# Patient Record
Sex: MALE | Race: OTHER | HISPANIC OR LATINO | Employment: PART TIME | URBAN - METROPOLITAN AREA
[De-identification: names, ages, dates, MRNs, and addresses within clinical notes are randomized per-mention and may not be internally consistent; named-entity substitution may affect disease eponyms.]

---

## 2022-02-23 ENCOUNTER — HOSPITAL ENCOUNTER (EMERGENCY)
Facility: HOSPITAL | Age: 32
Discharge: HOME | End: 2022-02-23
Attending: INTERNAL MEDICINE
Payer: COMMERCIAL

## 2022-02-23 VITALS
OXYGEN SATURATION: 99 % | DIASTOLIC BLOOD PRESSURE: 85 MMHG | TEMPERATURE: 97.2 F | RESPIRATION RATE: 18 BRPM | WEIGHT: 156 LBS | HEART RATE: 91 BPM | SYSTOLIC BLOOD PRESSURE: 154 MMHG | BODY MASS INDEX: 27.64 KG/M2 | HEIGHT: 63 IN

## 2022-02-23 DIAGNOSIS — L70.9 ACNE: Primary | ICD-10-CM

## 2022-02-23 DIAGNOSIS — G56.00 CARPAL TUNNEL SYNDROME: ICD-10-CM

## 2022-02-23 PROCEDURE — 99282 EMERGENCY DEPT VISIT SF MDM: CPT

## 2022-02-23 PROCEDURE — 99284 EMERGENCY DEPT VISIT MOD MDM: CPT | Performed by: INTERNAL MEDICINE

## 2022-02-23 RX ORDER — MULTIVITAMIN
1 CAPSULE ORAL DAILY
COMMUNITY

## 2022-02-23 RX ORDER — CLOBETASOL PROPIONATE 0.5 MG/G
CREAM TOPICAL 2 TIMES DAILY
Qty: 30 G | Refills: 1 | Status: SHIPPED | OUTPATIENT
Start: 2022-02-23

## 2022-02-23 RX ORDER — DOXYCYCLINE HYCLATE 100 MG/1
100 CAPSULE ORAL 2 TIMES DAILY
Qty: 20 CAPSULE | Refills: 0 | Status: SHIPPED | OUTPATIENT
Start: 2022-02-23 | End: 2022-03-05

## 2022-02-23 NOTE — DISCHARGE INSTRUCTIONS
Follow-up with primary care doctor, further workup of potential carpal tunnel  Take doxycycline twice a day for 14 days

## 2022-02-23 NOTE — ED PROVIDER NOTES
History  Chief Complaint   Patient presents with    Rash     Pt reports b/l arm tingling x 1 month at night  Pt also reports constant rash x 5 months +tenderness Pt denies itching, fevers, chills,      63-year-old  male presents with a rash  Comes intermittently  Usually on the chest   Occasionally paretic  Sometimes worse than now, seems to recur  Also describes numbness in both arms particularly when he goes to sleep  Prior to Admission Medications   Prescriptions Last Dose Informant Patient Reported? Taking? Multiple Vitamin (multivitamin) capsule   Yes Yes   Sig: Take 1 capsule by mouth daily      Facility-Administered Medications: None       Past Medical History:   Diagnosis Date    Known health problems: none        Past Surgical History:   Procedure Laterality Date    ORTHOPEDIC SURGERY      R foot        History reviewed  No pertinent family history  I have reviewed and agree with the history as documented  E-Cigarette/Vaping     E-Cigarette/Vaping Substances     Social History     Tobacco Use    Smoking status: Never Smoker    Smokeless tobacco: Never Used   Substance Use Topics    Alcohol use: Yes     Comment: occasionally     Drug use: Never       Review of Systems   Constitutional: Negative for chills and fever  HENT: Negative for rhinorrhea and sore throat  Eyes: Negative for visual disturbance  Respiratory: Negative for cough and shortness of breath  Cardiovascular: Negative for chest pain and leg swelling  Gastrointestinal: Negative for abdominal pain, diarrhea, nausea and vomiting  Genitourinary: Negative for dysuria  Musculoskeletal: Negative for back pain and myalgias  Skin: Positive for rash  Neurological: Positive for weakness and numbness  Negative for dizziness and headaches  Weakness occasion in left hand greater than the right  Both hands go numb particularly at night   Psychiatric/Behavioral: Negative for confusion     All other systems reviewed and are negative  Physical Exam  Physical Exam  Vitals and nursing note reviewed  Constitutional:       Appearance: He is well-developed  HENT:      Nose: Nose normal       Mouth/Throat:      Pharynx: No oropharyngeal exudate  Eyes:      General: No scleral icterus  Conjunctiva/sclera: Conjunctivae normal       Pupils: Pupils are equal, round, and reactive to light  Neck:      Vascular: No JVD  Trachea: No tracheal deviation  Cardiovascular:      Rate and Rhythm: Normal rate and regular rhythm  Heart sounds: Normal heart sounds  No murmur heard  Pulmonary:      Effort: Pulmonary effort is normal  No respiratory distress  Breath sounds: Normal breath sounds  No wheezing or rales  Abdominal:      General: Bowel sounds are normal       Palpations: Abdomen is soft  Tenderness: There is no abdominal tenderness  There is no guarding  Musculoskeletal:         General: No tenderness  Normal range of motion  Cervical back: Normal range of motion and neck supple  Skin:     General: Skin is warm and dry  Findings: Rash present  Comments: Actinic form rash across the chest and up into the shoulders  Comedone and erythema noted   Neurological:      Mental Status: He is alert and oriented to person, place, and time  Cranial Nerves: No cranial nerve deficit  Sensory: No sensory deficit  Motor: No abnormal muscle tone        Comments: 5/5 motor, nl sens, negative Tinel's positive Phalen's   Psychiatric:         Mood and Affect: Mood normal          Behavior: Behavior normal          Vital Signs  ED Triage Vitals [02/23/22 1226]   Temperature Pulse Respirations Blood Pressure SpO2   (!) 97 2 °F (36 2 °C) 91 18 154/85 99 %      Temp src Heart Rate Source Patient Position - Orthostatic VS BP Location FiO2 (%)   -- Monitor Sitting Right arm --      Pain Score       --           Vitals:    02/23/22 1226   BP: 154/85   Pulse: 91   Patient Position - Orthostatic VS: Sitting         Visual Acuity      ED Medications  Medications - No data to display    Diagnostic Studies  Results Reviewed     None                 No orders to display              Procedures  Procedures         ED Course                               SBIRT 22yo+      Most Recent Value   SBIRT (22 yo +)    In order to provide better care to our patients, we are screening all of our patients for alcohol and drug use  Would it be okay to ask you these screening questions? No Filed at: 02/23/2022 1238                    MDM    Disposition  Final diagnoses:   Acne   Carpal tunnel syndrome     Time reflects when diagnosis was documented in both MDM as applicable and the Disposition within this note     Time User Action Codes Description Comment    2/23/2022 12:44 PM Lashawn Willis Add [L70 9] Acne     2/23/2022 12:45 PM Lashawn Willis Add [G56 00] Carpal tunnel syndrome       ED Disposition     ED Disposition Condition Date/Time Comment    Discharge Stable Wed Feb 23, 2022 12:43 PM Mat Hem discharge to home/self care              Follow-up Information    None         Discharge Medication List as of 2/23/2022 12:51 PM      START taking these medications    Details   clobetasol (TEMOVATE) 0 05 % cream Apply topically 2 (two) times a day, Starting Wed 2/23/2022, Normal      doxycycline hyclate (VIBRAMYCIN) 100 mg capsule Take 1 capsule (100 mg total) by mouth 2 (two) times a day for 10 days, Starting Wed 2/23/2022, Until Sat 3/5/2022, Normal         CONTINUE these medications which have NOT CHANGED    Details   Multiple Vitamin (multivitamin) capsule Take 1 capsule by mouth daily, Historical Med                 PDMP Review     None          ED Provider  Electronically Signed by           Agustina Gonzales DO  02/23/22 9017

## 2022-03-30 ENCOUNTER — APPOINTMENT (OUTPATIENT)
Dept: RADIOLOGY | Facility: CLINIC | Age: 32
End: 2022-03-30
Payer: COMMERCIAL

## 2022-03-30 ENCOUNTER — APPOINTMENT (OUTPATIENT)
Dept: LAB | Facility: CLINIC | Age: 32
End: 2022-03-30
Payer: COMMERCIAL

## 2022-03-30 ENCOUNTER — OFFICE VISIT (OUTPATIENT)
Dept: FAMILY MEDICINE CLINIC | Facility: CLINIC | Age: 32
End: 2022-03-30
Payer: COMMERCIAL

## 2022-03-30 VITALS
HEIGHT: 63 IN | TEMPERATURE: 99.2 F | DIASTOLIC BLOOD PRESSURE: 62 MMHG | SYSTOLIC BLOOD PRESSURE: 112 MMHG | WEIGHT: 155.4 LBS | RESPIRATION RATE: 18 BRPM | OXYGEN SATURATION: 100 % | HEART RATE: 75 BPM | BODY MASS INDEX: 27.54 KG/M2

## 2022-03-30 DIAGNOSIS — Z00.00 ANNUAL PHYSICAL EXAM: ICD-10-CM

## 2022-03-30 DIAGNOSIS — M25.531 RIGHT WRIST PAIN: ICD-10-CM

## 2022-03-30 DIAGNOSIS — M79.602 PARESTHESIA AND PAIN OF BOTH UPPER EXTREMITIES: ICD-10-CM

## 2022-03-30 DIAGNOSIS — M79.601 PARESTHESIA AND PAIN OF BOTH UPPER EXTREMITIES: ICD-10-CM

## 2022-03-30 DIAGNOSIS — M54.12 CERVICAL RADICULOPATHY: ICD-10-CM

## 2022-03-30 DIAGNOSIS — Z11.4 SCREENING FOR HIV (HUMAN IMMUNODEFICIENCY VIRUS): ICD-10-CM

## 2022-03-30 DIAGNOSIS — Z11.59 NEED FOR HEPATITIS C SCREENING TEST: ICD-10-CM

## 2022-03-30 DIAGNOSIS — L70.9 ACNE: ICD-10-CM

## 2022-03-30 DIAGNOSIS — Z00.00 ANNUAL PHYSICAL EXAM: Primary | ICD-10-CM

## 2022-03-30 DIAGNOSIS — Z11.3 SCREENING FOR STDS (SEXUALLY TRANSMITTED DISEASES): ICD-10-CM

## 2022-03-30 DIAGNOSIS — R74.8 ELEVATED LIVER ENZYMES: ICD-10-CM

## 2022-03-30 DIAGNOSIS — R20.2 PARESTHESIA AND PAIN OF BOTH UPPER EXTREMITIES: ICD-10-CM

## 2022-03-30 LAB
ALBUMIN SERPL BCP-MCNC: 5 G/DL (ref 3.5–5)
ALP SERPL-CCNC: 92 U/L (ref 46–116)
ALT SERPL W P-5'-P-CCNC: 120 U/L (ref 12–78)
ANION GAP SERPL CALCULATED.3IONS-SCNC: 3 MMOL/L (ref 4–13)
AST SERPL W P-5'-P-CCNC: 85 U/L (ref 5–45)
BILIRUB DIRECT SERPL-MCNC: 0.1 MG/DL (ref 0–0.2)
BILIRUB SERPL-MCNC: 0.65 MG/DL (ref 0.2–1)
BUN SERPL-MCNC: 7 MG/DL (ref 5–25)
CALCIUM SERPL-MCNC: 9.4 MG/DL (ref 8.3–10.1)
CHLORIDE SERPL-SCNC: 108 MMOL/L (ref 100–108)
CHOLEST SERPL-MCNC: 264 MG/DL
CO2 SERPL-SCNC: 27 MMOL/L (ref 21–32)
CREAT SERPL-MCNC: 0.68 MG/DL (ref 0.6–1.3)
EST. AVERAGE GLUCOSE BLD GHB EST-MCNC: 100 MG/DL
GFR SERPL CREATININE-BSD FRML MDRD: 129 ML/MIN/1.73SQ M
GLUCOSE P FAST SERPL-MCNC: 99 MG/DL (ref 65–99)
HBA1C MFR BLD: 5.1 %
HBV CORE AB SER QL: NORMAL
HBV SURFACE AB SER-ACNC: 4.46 MIU/ML
HBV SURFACE AG SER QL: NORMAL
HCV AB SER QL: NORMAL
HDLC SERPL-MCNC: 52 MG/DL
LDLC SERPL CALC-MCNC: 181 MG/DL (ref 0–100)
NONHDLC SERPL-MCNC: 212 MG/DL
POTASSIUM SERPL-SCNC: 4 MMOL/L (ref 3.5–5.3)
PROT SERPL-MCNC: 8.1 G/DL (ref 6.4–8.2)
SODIUM SERPL-SCNC: 138 MMOL/L (ref 136–145)
TRIGL SERPL-MCNC: 154 MG/DL

## 2022-03-30 PROCEDURE — 1036F TOBACCO NON-USER: CPT | Performed by: INTERNAL MEDICINE

## 2022-03-30 PROCEDURE — 80076 HEPATIC FUNCTION PANEL: CPT

## 2022-03-30 PROCEDURE — 73110 X-RAY EXAM OF WRIST: CPT

## 2022-03-30 PROCEDURE — 80061 LIPID PANEL: CPT

## 2022-03-30 PROCEDURE — 80048 BASIC METABOLIC PNL TOTAL CA: CPT

## 2022-03-30 PROCEDURE — 99214 OFFICE O/P EST MOD 30 MIN: CPT | Performed by: INTERNAL MEDICINE

## 2022-03-30 PROCEDURE — 87491 CHLMYD TRACH DNA AMP PROBE: CPT

## 2022-03-30 PROCEDURE — 36415 COLL VENOUS BLD VENIPUNCTURE: CPT

## 2022-03-30 PROCEDURE — 86803 HEPATITIS C AB TEST: CPT

## 2022-03-30 PROCEDURE — 3725F SCREEN DEPRESSION PERFORMED: CPT | Performed by: INTERNAL MEDICINE

## 2022-03-30 PROCEDURE — 86592 SYPHILIS TEST NON-TREP QUAL: CPT

## 2022-03-30 PROCEDURE — 87389 HIV-1 AG W/HIV-1&-2 AB AG IA: CPT

## 2022-03-30 PROCEDURE — 3008F BODY MASS INDEX DOCD: CPT | Performed by: INTERNAL MEDICINE

## 2022-03-30 PROCEDURE — 87591 N.GONORRHOEAE DNA AMP PROB: CPT

## 2022-03-30 PROCEDURE — 99385 PREV VISIT NEW AGE 18-39: CPT | Performed by: INTERNAL MEDICINE

## 2022-03-30 PROCEDURE — 86706 HEP B SURFACE ANTIBODY: CPT

## 2022-03-30 PROCEDURE — 83036 HEMOGLOBIN GLYCOSYLATED A1C: CPT

## 2022-03-30 PROCEDURE — 87340 HEPATITIS B SURFACE AG IA: CPT

## 2022-03-30 PROCEDURE — 86704 HEP B CORE ANTIBODY TOTAL: CPT

## 2022-03-30 RX ORDER — METHOCARBAMOL 750 MG/1
750 TABLET, FILM COATED ORAL 4 TIMES DAILY PRN
COMMUNITY
Start: 2022-03-27 | End: 2022-03-30 | Stop reason: SDUPTHER

## 2022-03-30 RX ORDER — NAPROXEN 500 MG/1
500 TABLET ORAL EVERY 12 HOURS PRN
COMMUNITY
Start: 2022-03-27 | End: 2022-03-30 | Stop reason: SDUPTHER

## 2022-03-30 RX ORDER — NAPROXEN 500 MG/1
500 TABLET ORAL EVERY 12 HOURS PRN
Qty: 20 TABLET | Refills: 0 | Status: SHIPPED | OUTPATIENT
Start: 2022-03-30

## 2022-03-30 RX ORDER — BENZOYL PEROXIDE 50 MG/ML
LIQUID TOPICAL 2 TIMES DAILY
COMMUNITY

## 2022-03-30 RX ORDER — CLINDAMYCIN PHOSPHATE 10 MG/G
GEL TOPICAL 2 TIMES DAILY
COMMUNITY

## 2022-03-30 RX ORDER — METHOCARBAMOL 750 MG/1
750 TABLET, FILM COATED ORAL EVERY 6 HOURS PRN
Qty: 30 TABLET | Refills: 1 | Status: SHIPPED | OUTPATIENT
Start: 2022-03-30 | End: 2022-04-09

## 2022-03-30 NOTE — PATIENT INSTRUCTIONS
Wellness Visit for Adults   AMBULATORY CARE:   A wellness visit  is when you see your healthcare provider to get screened for health problems  Your healthcare provider will also give you advice on how to stay healthy  Write down your questions so you remember to ask them  Ask your healthcare provider how often you should have a wellness visit  What happens at a wellness visit:  Your healthcare provider will ask about your health, and your family history of health problems  This includes high blood pressure, heart disease, and cancer  He or she will ask if you have symptoms that concern you, if you smoke, and about your mood  You may also be asked about your intake of medicines, supplements, food, and alcohol  Any of the following may be done:  · Your weight  will be checked  Your height may also be checked so your body mass index (BMI) can be calculated  Your BMI shows if you are at a healthy weight  · Your blood pressure  and heart rate will be checked  Your temperature may also be checked  · Blood and urine tests  may be done  Blood tests may be done to check your cholesterol levels  Abnormal cholesterol levels increase your risk for heart disease and stroke  You may also need a blood or urine test to check for diabetes if you are at increased risk  Urine tests may be done to look for signs of an infection or kidney disease  · A physical exam  includes checking your heartbeat and lungs with a stethoscope  Your healthcare provider may also check your skin to look for sun damage  · Screening tests  may be recommended  A screening test is done to check for diseases that may not cause symptoms  The screening tests you may need depend on your age, gender, family history, and lifestyle habits  For example, colorectal screening may be recommended if you are 48years old or older  Screening tests you need if you are a woman:   · A Pap smear  is used to screen for cervical cancer   Pap smears are usually done every 3 to 5 years depending on your age  You may need them more often if you have had abnormal Pap smear test results in the past  Ask your healthcare provider how often you should have a Pap smear  · A mammogram  is an x-ray of your breasts to screen for breast cancer  Experts recommend mammograms every 2 years starting at age 48 years  You may need a mammogram at age 52 years or younger if you have an increased risk for breast cancer  Talk to your healthcare provider about when you should start having mammograms and how often you need them  Vaccines you may need:   · Get an influenza vaccine  every year  The influenza vaccine protects you from the flu  Several types of viruses cause the flu  The viruses change over time, so new vaccines are made each year  · Get a tetanus-diphtheria (Td) booster vaccine  every 10 years  This vaccine protects you against tetanus and diphtheria  Tetanus is a severe infection that may cause painful muscle spasms and lockjaw  Diphtheria is a severe bacterial infection that causes a thick covering in the back of your mouth and throat  · Get a human papillomavirus (HPV) vaccine  if you are female and aged 23 to 32 or male 23 to 24 and never received it  This vaccine protects you from HPV infection  HPV is the most common infection spread by sexual contact  HPV may also cause vaginal, penile, and anal cancers  · Get a pneumococcal vaccine  if you are aged 72 years or older  The pneumococcal vaccine is an injection given to protect you from pneumococcal disease  Pneumococcal disease is an infection caused by pneumococcal bacteria  The infection may cause pneumonia, meningitis, or an ear infection  · Get a shingles vaccine  if you are 60 or older, even if you have had shingles before  The shingles vaccine is an injection to protect you from the varicella-zoster virus  This is the same virus that causes chickenpox   Shingles is a painful rash that develops in people who had chickenpox or have been exposed to the virus  How to eat healthy:  My Plate is a model for planning healthy meals  It shows the types and amounts of foods that should go on your plate  Fruits and vegetables make up about half of your plate, and grains and protein make up the other half  A serving of dairy is included on the side of your plate  The amount of calories and serving sizes you need depends on your age, gender, weight, and height  Examples of healthy foods are listed below:  · Eat a variety of vegetables  such as dark green, red, and orange vegetables  You can also include canned vegetables low in sodium (salt) and frozen vegetables without added butter or sauces  · Eat a variety of fresh fruits , canned fruit in 100% juice, frozen fruit, and dried fruit  · Include whole grains  At least half of the grains you eat should be whole grains  Examples include whole-wheat bread, wheat pasta, brown rice, and whole-grain cereals such as oatmeal     · Eat a variety of protein foods such as seafood (fish and shellfish), lean meat, and poultry without skin (turkey and chicken)  Examples of lean meats include pork leg, shoulder, or tenderloin, and beef round, sirloin, tenderloin, and extra lean ground beef  Other protein foods include eggs and egg substitutes, beans, peas, soy products, nuts, and seeds  · Choose low-fat dairy products such as skim or 1% milk or low-fat yogurt, cheese, and cottage cheese  · Limit unhealthy fats  such as butter, hard margarine, and shortening  Exercise:  Exercise at least 30 minutes per day on most days of the week  Some examples of exercise include walking, biking, dancing, and swimming  You can also fit in more physical activity by taking the stairs instead of the elevator or parking farther away from stores  Include muscle strengthening activities 2 days each week  Regular exercise provides many health benefits   It helps you manage your weight, and decreases your risk for type 2 diabetes, heart disease, stroke, and high blood pressure  Exercise can also help improve your mood  Ask your healthcare provider about the best exercise plan for you  General health and safety guidelines:   · Do not smoke  Nicotine and other chemicals in cigarettes and cigars can cause lung damage  Ask your healthcare provider for information if you currently smoke and need help to quit  E-cigarettes or smokeless tobacco still contain nicotine  Talk to your healthcare provider before you use these products  · Limit alcohol  A drink of alcohol is 12 ounces of beer, 5 ounces of wine, or 1½ ounces of liquor  · Lose weight, if needed  Being overweight increases your risk of certain health conditions  These include heart disease, high blood pressure, type 2 diabetes, and certain types of cancer  · Protect your skin  Do not sunbathe or use tanning beds  Use sunscreen with a SPF 15 or higher  Apply sunscreen at least 15 minutes before you go outside  Reapply sunscreen every 2 hours  Wear protective clothing, hats, and sunglasses when you are outside  · Drive safely  Always wear your seatbelt  Make sure everyone in your car wears a seatbelt  A seatbelt can save your life if you are in an accident  Do not use your cell phone when you are driving  This could distract you and cause an accident  Pull over if you need to make a call or send a text message  · Practice safe sex  Use latex condoms if are sexually active and have more than one partner  Your healthcare provider may recommend screening tests for sexually transmitted infections (STIs)  · Wear helmets, lifejackets, and protective gear  Always wear a helmet when you ride a bike or motorcycle, go skiing, or play sports that could cause a head injury  Wear protective equipment when you play sports  Wear a lifejacket when you are on a boat or doing water sports      © Copyright [employer] 2022 Information is for End User's use only and may not be sold, redistributed or otherwise used for commercial purposes  All illustrations and images included in CareNotes® are the copyrighted property of [employer]  or Nina Richards  The above information is an  only  It is not intended as medical advice for individual conditions or treatments  Talk to your doctor, nurse or pharmacist before following any medical regimen to see if it is safe and effective for you  Weight Management   AMBULATORY CARE:   Why it is important to manage your weight:  Being overweight increases your risk of health conditions such as heart disease, high blood pressure, type 2 diabetes, and certain types of cancer  It can also increase your risk for osteoarthritis, sleep apnea, and other respiratory problems  Aim for a slow, steady weight loss  Even a small amount of weight loss can lower your risk of health problems  Risks of being overweight:  Extra weight can cause many health problems, including the following:  · Diabetes (high blood sugar level)    · High blood pressure or high cholesterol    · Heart disease    · Stroke    · Gallbladder or liver disease    · Cancer of the colon, breast, prostate, liver, or kidney    · Sleep apnea    · Arthritis or gout    Screening  is done to check for health conditions before you have signs or symptoms  If you are 28to 79years old, your blood sugar level may be checked every 3 years for signs of prediabetes or diabetes  Your healthcare provider will check your blood pressure at each visit  High blood pressure can lead to a stroke or other problems  Your provider may check for signs of heart disease, cancer, or other health problems  How to lose weight safely:  A safe and healthy way to lose weight is to eat fewer calories and get regular exercise  · You can lose up about 1 pound a week by decreasing the number of calories you eat by 500 calories each day   You can decrease calories by eating smaller portion sizes or by cutting out high-calorie foods  Read labels to find out how many calories are in the foods you eat  · You can also burn calories with exercise such as walking, swimming, or biking  You will be more likely to keep weight off if you make these changes part of your lifestyle  Exercise at least 30 minutes per day on most days of the week  You can also fit in more physical activity by taking the stairs instead of the elevator or parking farther away from stores  Ask your healthcare provider about the best exercise plan for you  Healthy meal plan for weight management:  A healthy meal plan includes a variety of foods, contains fewer calories, and helps you stay healthy  A healthy meal plan includes the following:     · Eat whole-grain foods more often  A healthy meal plan should contain fiber  Fiber is the part of grains, fruits, and vegetables that is not broken down by your body  Whole-grain foods are healthy and provide extra fiber in your diet  Some examples of whole-grain foods are whole-wheat breads and pastas, oatmeal, brown rice, and bulgur  · Eat a variety of vegetables every day  Include dark, leafy greens such as spinach, kale, haja greens, and mustard greens  Eat yellow and orange vegetables such as carrots, sweet potatoes, and winter squash  · Eat a variety of fruits every day  Choose fresh or canned fruit (canned in its own juice or light syrup) instead of juice  Fruit juice has very little or no fiber  · Eat low-fat dairy foods  Drink fat-free (skim) milk or 1% milk  Eat fat-free yogurt and low-fat cottage cheese  Try low-fat cheeses such as mozzarella and other reduced-fat cheeses  · Choose meat and other protein foods that are low in fat  Choose beans or other legumes such as split peas or lentils  Choose fish, skinless poultry (chicken or turkey), or lean cuts of red meat (beef or pork)   Before you cook meat or poultry, cut off any visible fat  · Use less fat and oil  Try baking foods instead of frying them  Add less fat, such as margarine, sour cream, regular salad dressing and mayonnaise to foods  Eat fewer high-fat foods  Some examples of high-fat foods include french fries, doughnuts, ice cream, and cakes  · Eat fewer sweets  Limit foods and drinks that are high in sugar  This includes candy, cookies, regular soda, and sweetened drinks  Ways to decrease calories:   · Eat smaller portions  ? Use a small plate with smaller servings  ? Do not eat second helpings  ? When you eat at a restaurant, ask for a box and place half of your meal in the box before you eat  ? Share an entrée with someone else  · Replace high-calorie snacks with healthy, low-calorie snacks  ? Choose fresh fruit, vegetables, fat-free rice cakes, or air-popped popcorn instead of potato chips, nuts, or chocolate  ? Choose water or calorie-free drinks instead of soda or sweetened drinks  · Do not shop for groceries when you are hungry  You may be more likely to make unhealthy food choices  Take a grocery list of healthy foods and shop after you have eaten  · Eat regular meals  Do not skip meals  Skipping meals can lead to overeating later in the day  This can make it harder for you to lose weight  Eat a healthy snack in place of a meal if you do not have time to eat a regular meal  Talk with a dietitian to help you create a meal plan and schedule that is right for you  Other things to consider as you try to lose weight:   · Be aware of situations that may give you the urge to overeat, such as eating while watching television  Find ways to avoid these situations  For example, read a book, go for a walk, or do crafts  · Meet with a weight loss support group or friends who are also trying to lose weight  This may help you stay motivated to continue working on your weight loss goals      © Copyright [employer] 2022 Information is for End User's use only and may not be sold, redistributed or otherwise used for commercial purposes  All illustrations and images included in CareNotes® are the copyrighted property of [employer]  or Nina Richards  The above information is an  only  It is not intended as medical advice for individual conditions or treatments  Talk to your doctor, nurse or pharmacist before following any medical regimen to see if it is safe and effective for you  Wellness Visit for Adults   AMBULATORY CARE:   A wellness visit  is when you see your healthcare provider to get screened for health problems  Your healthcare provider will also give you advice on how to stay healthy  Write down your questions so you remember to ask them  Ask your healthcare provider how often you should have a wellness visit  What happens at a wellness visit:  Your healthcare provider will ask about your health, and your family history of health problems  This includes high blood pressure, heart disease, and cancer  He or she will ask if you have symptoms that concern you, if you smoke, and about your mood  You may also be asked about your intake of medicines, supplements, food, and alcohol  Any of the following may be done:  · Your weight  will be checked  Your height may also be checked so your body mass index (BMI) can be calculated  Your BMI shows if you are at a healthy weight  · Your blood pressure  and heart rate will be checked  Your temperature may also be checked  · Blood and urine tests  may be done  Blood tests may be done to check your cholesterol levels  Abnormal cholesterol levels increase your risk for heart disease and stroke  You may also need a blood or urine test to check for diabetes if you are at increased risk  Urine tests may be done to look for signs of an infection or kidney disease  · A physical exam  includes checking your heartbeat and lungs with a stethoscope   Your healthcare provider may also check your skin to look for sun damage  · Screening tests  may be recommended  A screening test is done to check for diseases that may not cause symptoms  The screening tests you may need depend on your age, gender, family history, and lifestyle habits  For example, colorectal screening may be recommended if you are 48years old or older  Screening tests you need if you are a woman:   · A Pap smear  is used to screen for cervical cancer  Pap smears are usually done every 3 to 5 years depending on your age  You may need them more often if you have had abnormal Pap smear test results in the past  Ask your healthcare provider how often you should have a Pap smear  · A mammogram  is an x-ray of your breasts to screen for breast cancer  Experts recommend mammograms every 2 years starting at age 48 years  You may need a mammogram at age 52 years or younger if you have an increased risk for breast cancer  Talk to your healthcare provider about when you should start having mammograms and how often you need them  Vaccines you may need:   · Get an influenza vaccine  every year  The influenza vaccine protects you from the flu  Several types of viruses cause the flu  The viruses change over time, so new vaccines are made each year  · Get a tetanus-diphtheria (Td) booster vaccine  every 10 years  This vaccine protects you against tetanus and diphtheria  Tetanus is a severe infection that may cause painful muscle spasms and lockjaw  Diphtheria is a severe bacterial infection that causes a thick covering in the back of your mouth and throat  · Get a human papillomavirus (HPV) vaccine  if you are female and aged 23 to 32 or male 23 to 24 and never received it  This vaccine protects you from HPV infection  HPV is the most common infection spread by sexual contact  HPV may also cause vaginal, penile, and anal cancers  · Get a pneumococcal vaccine  if you are aged 72 years or older   The pneumococcal vaccine is an injection given to protect you from pneumococcal disease  Pneumococcal disease is an infection caused by pneumococcal bacteria  The infection may cause pneumonia, meningitis, or an ear infection  · Get a shingles vaccine  if you are 60 or older, even if you have had shingles before  The shingles vaccine is an injection to protect you from the varicella-zoster virus  This is the same virus that causes chickenpox  Shingles is a painful rash that develops in people who had chickenpox or have been exposed to the virus  How to eat healthy:  My Plate is a model for planning healthy meals  It shows the types and amounts of foods that should go on your plate  Fruits and vegetables make up about half of your plate, and grains and protein make up the other half  A serving of dairy is included on the side of your plate  The amount of calories and serving sizes you need depends on your age, gender, weight, and height  Examples of healthy foods are listed below:  · Eat a variety of vegetables  such as dark green, red, and orange vegetables  You can also include canned vegetables low in sodium (salt) and frozen vegetables without added butter or sauces  · Eat a variety of fresh fruits , canned fruit in 100% juice, frozen fruit, and dried fruit  · Include whole grains  At least half of the grains you eat should be whole grains  Examples include whole-wheat bread, wheat pasta, brown rice, and whole-grain cereals such as oatmeal     · Eat a variety of protein foods such as seafood (fish and shellfish), lean meat, and poultry without skin (turkey and chicken)  Examples of lean meats include pork leg, shoulder, or tenderloin, and beef round, sirloin, tenderloin, and extra lean ground beef  Other protein foods include eggs and egg substitutes, beans, peas, soy products, nuts, and seeds  · Choose low-fat dairy products such as skim or 1% milk or low-fat yogurt, cheese, and cottage cheese      · Limit unhealthy fats such as butter, hard margarine, and shortening  Exercise:  Exercise at least 30 minutes per day on most days of the week  Some examples of exercise include walking, biking, dancing, and swimming  You can also fit in more physical activity by taking the stairs instead of the elevator or parking farther away from stores  Include muscle strengthening activities 2 days each week  Regular exercise provides many health benefits  It helps you manage your weight, and decreases your risk for type 2 diabetes, heart disease, stroke, and high blood pressure  Exercise can also help improve your mood  Ask your healthcare provider about the best exercise plan for you  General health and safety guidelines:   · Do not smoke  Nicotine and other chemicals in cigarettes and cigars can cause lung damage  Ask your healthcare provider for information if you currently smoke and need help to quit  E-cigarettes or smokeless tobacco still contain nicotine  Talk to your healthcare provider before you use these products  · Limit alcohol  A drink of alcohol is 12 ounces of beer, 5 ounces of wine, or 1½ ounces of liquor  · Lose weight, if needed  Being overweight increases your risk of certain health conditions  These include heart disease, high blood pressure, type 2 diabetes, and certain types of cancer  · Protect your skin  Do not sunbathe or use tanning beds  Use sunscreen with a SPF 15 or higher  Apply sunscreen at least 15 minutes before you go outside  Reapply sunscreen every 2 hours  Wear protective clothing, hats, and sunglasses when you are outside  · Drive safely  Always wear your seatbelt  Make sure everyone in your car wears a seatbelt  A seatbelt can save your life if you are in an accident  Do not use your cell phone when you are driving  This could distract you and cause an accident  Pull over if you need to make a call or send a text message  · Practice safe sex    Use latex condoms if are sexually active and have more than one partner  Your healthcare provider may recommend screening tests for sexually transmitted infections (STIs)  · Wear helmets, lifejackets, and protective gear  Always wear a helmet when you ride a bike or motorcycle, go skiing, or play sports that could cause a head injury  Wear protective equipment when you play sports  Wear a lifejacket when you are on a boat or doing water sports  © Copyright [address] 2022 Information is for End User's use only and may not be sold, redistributed or otherwise used for commercial purposes  All illustrations and images included in CareNotes® are the copyrighted property of [employer]  or [address] [place], [place] [address]estrella Manley   The above information is an  only  It is not intended as medical advice for individual conditions or treatments  Talk to your doctor, nurse or pharmacist before following any medical regimen to see if it is safe and effective for you

## 2022-03-30 NOTE — PROGRESS NOTES
[address] [place] [address] Primary Care        NAME: Cinda Mcintyre is a 36 y o  male  : 1990    MRN: [id number]  DATE: 2022  TIME: 9:15 AM    Assessment and Plan   1  Paresthesia and pain of both upper extremities  - seen in ER twice in the past month  Initially diagnosed with bilateral carpal tunnel, then cervical radiculopathy  Negative phalen and tinel sign on exam, strength is intact  CT cervical spine showing possible trapezius spasm  He is already scheduled with Ortho for follow up  2  Acne  -    Mainly on his chest, already seeing dermatology, using topical agents with improvement    3  Cervical radiculopathy  -     methocarbamol (ROBAXIN) 750 mg tablet; Take 1 tablet (750 mg total) by mouth every 6 (six) hours as needed for muscle spasms for up to 10 days  -     naproxen (NAPROSYN) 500 mg tablet; Take 1 tablet (500 mg total) by mouth every 12 (twelve) hours as needed for moderate pain    4  Elevated liver enzymes  - labs showed AST 43, ALT 60, he does admit to heavy drinking on weekends (6-10 beers), advised to cut down, check for viral hepatitis     -  Hepatic function panel; Future  -     Basic metabolic panel; Future  -     Hemoglobin A1C; Future  -     Hepatitis B surface antibody; Future  -     Hepatitis B core antibody, total; Future  -     Hepatitis B surface antigen; Future  -     Hepatitis C antibody; Future    5  Annual physical exam  -     Lipid panel; Future    6  Screening for HIV (human immunodeficiency virus)  -     HIV 1/2 Antigen/Antibody (4th Generation) w Reflex [facility]; Future    7  Need for hepatitis C screening test  -     Hepatitis C antibody; Future    8  Screening for STDs (sexually transmitted diseases)  -     Urine Chlamydia/GC amplified DNA by PCR; Future  -     RPR; Future    9  Right wrist pain  -suffered an injury to right wrist as a child, limited dorsiflexion on exam, no pain or stiffness      - XR wrist 3+ vw left;  Future; Expected date: 03/30/2022  -     XR wrist 3+ vw right; Future; Expected date: 03/30/2022             Chief Complaint     Chief Complaint   Patient presents with   [address]     ER follow up    Annual Exam         History of Present Illness       Here to establish care  Girlfriend helps with Arabic translation at pt's request  Was in ED twice for bilateral arm and hand pain and numbness  Started about 2 months ago, intermittently at first and went to ER in February  Diagnosed then with carpal tunnel syndrome  Went back to ED over the weekend due to severe pain, numbness in hands and arms  Reports that CT showed muscle spasm, given robaxin and naprosyn with some relief  Has Ortho appointment next week  Pain is worse at night when lying supine and relieved by sitting up  Denies neck pain, headache or recent injury  Denies weakness or dropping things  Reports injury to right wrist age 12 yo  Past medical hx notable for acne, mostly on his chest  Seeing dermatologist for this  Also had submandibular lymph node removed about 10 years ago [address]  He isn't sure reason, other than it was "inflamed"  Fhx notable for father with kidney problems, but he is not on dialysis  Mother is healthy  Denies family hx of diabetes, CAD, stroke or cancer  Social hx: originally from [place], been in [address] for 10 years  Does not smoke cigarettes, drinks alcohol on weekends, sometimes 6-10 beers at a time  Denies illicit drug use  Sexually active with girlfriend  Review of Systems   Review of Systems   Constitutional: Negative for chills, fatigue, fever and unexpected weight change  HENT: Negative for congestion, postnasal drip, rhinorrhea, sore throat and trouble swallowing  Eyes: Negative for pain, redness, itching and visual disturbance  Respiratory: Negative for cough, chest tightness, shortness of breath and wheezing  Cardiovascular: Negative for chest pain, palpitations and leg swelling     Gastrointestinal: Negative for abdominal pain, blood in stool, constipation, diarrhea, nausea and vomiting  Endocrine: Negative for cold intolerance, heat intolerance, polydipsia and polyuria  Genitourinary: Negative for dysuria, frequency, hematuria and urgency  Musculoskeletal: Positive for arthralgias and myalgias  Negative for back pain and joint swelling  Skin: Negative for rash  Neurological: Positive for numbness  Negative for dizziness, tremors, weakness, light-headedness and headaches  Psychiatric/Behavioral: Negative for confusion, dysphoric mood, hallucinations and suicidal ideas  The patient is not nervous/anxious            Current Medications       Current Outpatient Medications:     benzoyl peroxide (benzoyl peroxide) 5 % external liquid, Apply topically 2 (two) times a day, Disp: , Rfl:     clindamycin (CLINDAGEL) 1 % gel, Apply topically 2 (two) times a day, Disp: , Rfl:     DOXYCYCLINE CALCIUM PO, Take by mouth, Disp: , Rfl:     methocarbamol (ROBAXIN) 750 mg tablet, Take 1 tablet (750 mg total) by mouth every 6 (six) hours as needed for muscle spasms for up to 10 days, Disp: 30 tablet, Rfl: 1    Multiple Vitamin (multivitamin) capsule, Take 1 capsule by mouth daily, Disp: , Rfl:     naproxen (NAPROSYN) 500 mg tablet, Take 1 tablet (500 mg total) by mouth every 12 (twelve) hours as needed for moderate pain, Disp: 20 tablet, Rfl: 0    patient supplied medication, TRETINOIN CREAM 0 05%, Disp: , Rfl:     clobetasol (TEMOVATE) 0 05 % cream, Apply topically 2 (two) times a day (Patient not taking: Reported on 3/30/2022 ), Disp: 30 g, Rfl: 1    Current Allergies     Allergies as of 03/30/2022    (No Known Allergies)            The following portions of the patient's history were reviewed and updated as appropriate: allergies, current medications, past family history, past medical history, past social history, past surgical history and problem list      Past Medical History:   Diagnosis Date    Known health problems: none        Past Surgical History:   Procedure Laterality Date    ANKLE SURGERY Right     NECK SURGERY      submandibular surgery in [place], possible lymph node       History reviewed  No pertinent family history  Medications have been verified  Objective   /62   Pulse 75   Temp 99 2 °F (37 3 °C)   Resp 18   Ht 5' 3" (1 6 m)   Wt 70 5 kg (155 lb 6 4 oz)   SpO2 100%   BMI 27 53 kg/m²        Physical Exam     Physical Exam  Vitals reviewed  Constitutional:       General: He is not in acute distress  Appearance: He is normal weight  HENT:      Head: Normocephalic and atraumatic  Right Ear: Tympanic membrane, ear canal and external ear normal       Left Ear: Tympanic membrane, ear canal and external ear normal       Mouth/Throat:      Pharynx: No oropharyngeal exudate or posterior oropharyngeal erythema  Eyes:      General: No scleral icterus  Cardiovascular:      Rate and Rhythm: Normal rate and regular rhythm  Heart sounds: Normal heart sounds  No murmur heard  No friction rub  No gallop  Pulmonary:      Effort: Pulmonary effort is normal  No respiratory distress  Breath sounds: Normal breath sounds  No wheezing, rhonchi or rales  Abdominal:      General: Abdomen is flat  Bowel sounds are normal  There is no distension  Palpations: Abdomen is soft  There is no mass  Tenderness: There is no abdominal tenderness  There is no guarding or rebound  Hernia: No hernia is present  Musculoskeletal:      Right wrist: Decreased range of motion (dorsiflexion)  Left wrist: Normal       Right hand: Normal       Left hand: Normal       Cervical back: Normal range of motion  No pain with movement, spinous process tenderness or muscular tenderness  Comments: Negative phalen, tinel sign bilaterally   Lymphadenopathy:      Cervical: No cervical adenopathy  Neurological:      General: No focal deficit present        Mental Status: He is alert       Motor: No weakness  Psychiatric:         Mood and Affect: Mood normal          Behavior: Behavior normal          Thought Content: Thought content normal          Judgment: Judgment normal              Results:  Labs 03/27/22  WBC 6 4, Hgb 15 4, Hct 45 4, Plt 208    Bun 9, Cr 0 75, Glucose 111    AST 43, ALT 60, Alk Phos 98, Tbili 0 6    CT Cervical spine 03/27/22    FINDINGS CERVICAL SPINE:     Normal foramen magnum  Normal alignment of craniovertebral junction  Normal anterior atlantoaxial articulation  Normal odontoid process  There is straightening of the normal cervical lordosis  There is no compression deformity  Normal alignment of posterior osseous elements  No suspicious bony lesion  No subluxation  No suspicious endplate erosion  No large disc herniation  On axial image 51/2029, there is a vascular channel within the lamina of C2 on the left   Normal visualized prevertebral soft tissue structures

## 2022-03-30 NOTE — PROGRESS NOTES
[facility]ULT [facility]NUAL [facility]    NAME: Giles Garza  AGE: 36 y o  SEX: male  : 1990     DATE: 3/30/2022     Assessment and Plan:     Problem List Items Addressed This Visit        Nervous and Auditory    Cervical radiculopathy    Relevant Medications    methocarbamol (ROBAXIN) 750 mg tablet    naproxen (NAPROSYN) 500 mg tablet       Musculoskeletal and Integument    Acne    Relevant Medications    benzoyl peroxide (benzoyl peroxide) 5 % external liquid    clindamycin (CLINDAGEL) 1 % gel       Other    Paresthesia and pain of both upper extremities    Elevated liver enzymes    Relevant Orders    Hepatic function panel    Basic metabolic panel    Hemoglobin A1C    Hepatitis B surface antibody    Hepatitis B core antibody, total    Hepatitis B surface antigen    Hepatitis C antibody      Other Visit Diagnoses     Annual physical exam    -  Primary    Relevant Orders    Lipid panel    Screening for HIV (human immunodeficiency virus)        Relevant Orders    HIV 1/2 Antigen/Antibody (4th Generation) w Reflex [facility]    Need for hepatitis C screening test        Relevant Orders    Hepatitis C antibody    Screening for STDs (sexually transmitted diseases)        Relevant Orders    Urine Chlamydia/GC amplified DNA by PCR    RPR    Right wrist pain        Relevant Orders    XR wrist 3+ vw left    XR wrist 3+ vw right          Immunizations and preventive care screenings were discussed with patient today  Appropriate education was printed on patient's after visit summary  Counseling:  Alcohol/drug use: discussed moderation in alcohol intake, the recommendations for healthy alcohol use, and avoidance of illicit drug use  Dental Health: discussed importance of regular tooth brushing, flossing, and dental visits    Injury prevention: discussed safety/seat belts, safety helmets, smoke detectors, carbon dioxide detectors, and smoking near bedding or upholstery  Sexual health: discussed sexually transmitted diseases, partner selection, use of condoms, avoidance of unintended pregnancy, and contraceptive alternatives  · Exercise: the importance of regular exercise/physical activity was discussed  Recommend exercise 3-5 times per week for at least 30 minutes  BMI Counseling: Body mass index is 27 53 kg/m²  The BMI is above normal  Nutrition recommendations include encouraging healthy choices of fruits and vegetables, limiting drinks that contain sugar and moderation in carbohydrate intake  Exercise recommendations include moderate physical activity 150 minutes/week  Rationale for BMI follow-up plan is due to patient being overweight or obese  Depression Screening and Follow-up Plan: Patient was screened for depression during today's encounter  They screened negative with a PHQ-2 score of 0  Return in about 1 year (around 3/30/2023) for Annual physical      Chief Complaint:     Chief Complaint   Patient presents with   [address]     ER follow up    Annual Exam      History of Present Illness:     Adult Annual Physical   Patient here for a comprehensive physical exam  The patient reports problems - refer to separate note  Diet and Physical Activity  · Diet/Nutrition: well balanced diet  · Exercise: no formal exercise  Depression Screening  PHQ-2/9 Depression Screening    Little interest or pleasure in doing things: 0 - not at all  Feeling down, depressed, or hopeless: 0 - not at all  PHQ-2 Score: 0  PHQ-2 Interpretation: Negative depression screen       General Health  · Sleep: sleeps well  · Hearing: normal - bilateral   · Vision: no vision problems and goes for regular eye exams  · Dental: regular dental visits          Health  · History of STDs?: no      Review of Systems:     Review of Systems   Refer to separate note     Past Medical History:     Past Medical History:   Diagnosis Date    Known health problems: none       Past Surgical History:     Past Surgical History:   Procedure Laterality Date    ANKLE SURGERY Right     NECK SURGERY      submandibular surgery in [place], possible lymph node      Social History:     Social History     Socioeconomic History    Marital status: Single     Spouse name: None    Number of children: None    Years of education: None    Highest education level: None   Occupational History    None   Tobacco Use    Smoking status: Never Smoker    Smokeless tobacco: Never Used   Substance and Sexual Activity    Alcohol use: Yes     Comment: occasionally     Drug use: Never    Sexual activity: None   Other Topics Concern    None   Social History Narrative    None     Social Determinants of Health     Financial Resource Strain: Not on file   Food Insecurity: Not on file   Transportation Needs: Not on file   Physical Activity: Not on file   Stress: Not on file   Social Connections: Not on file   Intimate Partner Violence: Not on file   Housing Stability: Not on file      Family History:     History reviewed  No pertinent family history     Current Medications:     Current Outpatient Medications   Medication Sig Dispense Refill    benzoyl peroxide (benzoyl peroxide) 5 % external liquid Apply topically 2 (two) times a day      clindamycin (CLINDAGEL) 1 % gel Apply topically 2 (two) times a day      DOXYCYCLINE CALCIUM PO Take by mouth      methocarbamol (ROBAXIN) 750 mg tablet Take 1 tablet (750 mg total) by mouth every 6 (six) hours as needed for muscle spasms for up to 10 days 30 tablet 1    Multiple Vitamin (multivitamin) capsule Take 1 capsule by mouth daily      naproxen (NAPROSYN) 500 mg tablet Take 1 tablet (500 mg total) by mouth every 12 (twelve) hours as needed for moderate pain 20 tablet 0    patient supplied medication TRETINOIN CREAM 0 05%      clobetasol (TEMOVATE) 0 05 % cream Apply topically 2 (two) times a day (Patient not taking: Reported on 3/30/2022 ) 30 g 1     No current facility-administered medications for this visit        Allergies:     No Known Allergies   Physical Exam:     /62   Pulse 75   Temp 99 2 °F (37 3 °C)   Resp 18   Ht 5' 3" (1 6 m)   Wt 70 5 kg (155 lb 6 4 oz)   SpO2 100%   BMI 27 53 kg/m²     Physical Exam   Refer to separate note    Nimesh Samano MD   [facility]

## 2022-03-31 DIAGNOSIS — R74.8 ELEVATED LIVER ENZYMES: Primary | ICD-10-CM

## 2022-03-31 LAB — RPR SER QL: NORMAL

## 2022-04-01 ENCOUNTER — TELEPHONE (OUTPATIENT)
Dept: FAMILY MEDICINE CLINIC | Facility: CLINIC | Age: 32
End: 2022-04-01

## 2022-04-01 DIAGNOSIS — R74.8 ELEVATED LIVER ENZYMES: Primary | ICD-10-CM

## 2022-04-01 LAB — HIV 1+2 AB+HIV1 P24 AG SERPL QL IA: NORMAL

## 2022-04-01 NOTE — TELEPHONE ENCOUNTER
Patient's girlfriend called, on communication consent, to go over 3/30/22 results  Said that Juan C was confused  Informed her of results and she verbalized understanding  What labs would you like him to repeat in 3 months? I will order them  Replaced order for US

## 2022-04-02 LAB
C TRACH DNA SPEC QL NAA+PROBE: NEGATIVE
N GONORRHOEA DNA SPEC QL NAA+PROBE: NEGATIVE

## 2023-03-29 ENCOUNTER — RA CDI HCC (OUTPATIENT)
Dept: OTHER | Facility: HOSPITAL | Age: 33
End: 2023-03-29

## 2023-03-29 NOTE — PROGRESS NOTES
[facility]  coding opportunities       Chart reviewed, no opportunity found: CHART REVIEWED, NO OPPORTUNITY FOUND      This is a reminder to address ALL HCC (risk adjustment) codes as found on active problem list for 2023 as patient scores reset to zero [id number]    Patients Insurance        Commercial Insurance: [address] [place] [address]

## 2024-09-17 ENCOUNTER — TELEPHONE (OUTPATIENT)
Age: 34
End: 2024-09-17

## 2024-09-17 RX ORDER — SULFAMETHOXAZOLE/TRIMETHOPRIM 800-160 MG
1 TABLET ORAL 2 TIMES DAILY
COMMUNITY
Start: 2024-09-09

## 2024-09-17 NOTE — PROGRESS NOTES
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    The technical details of lateral internal sphincterotomy as well as the options benefits risks and alternatives were thoroughly discussed with the patient through the aid of a medical Czech speaking.  Specific risks related to anesthesia, bleeding, infection and temporary or permanent fecal incontinence were explained in clear simple terms.    The patient's wife was present for the discussion.    Alternatives to surgery including the option for continued medical therapy or definitive treatment by colorectal surgery were discussed.    All questions answered to the satisfaction of the patient and informed written consent obtained to proceed.           History of Present Illness     Paulino Willis is a 34 y.o. male who presents with Hemorrhoids  and no history of having a c-scope. He states that he has had blood in the stool and itching for the past 2months he also has a history of an anal fissure. No fever/chills.[email or web address]    History obtained from : patient  Review of Systems   Constitutional:  Negative for chills and fever.   HENT:  Negative for ear pain and sore throat.    Eyes:  Negative for pain and visual disturbance.   Respiratory:  Negative for cough and shortness of breath.    Cardiovascular:  Negative for chest pain and palpitations.   Gastrointestinal:  Negative for abdominal pain and vomiting.   Genitourinary:  Negative for dysuria and hematuria.   Musculoskeletal:  Negative for arthralgias and back pain.    Skin:  Negative for color change and rash.   Neurological:  Negative for seizures and syncope.   All other systems reviewed and are negative.    Pertinent Medical History         Medical History Reviewed by provider this encounter:       Past Medical History   Past Medical History:   Diagnosis Date    Known health problems: none      Past Surgical History:   Procedure Laterality Date    ANKLE SURGERY Right     NECK SURGERY      submandibular surgery in [place], possible lymph node     No family history on file.  Current Outpatient Medications on File Prior to Visit   Medication Sig Dispense Refill    benzoyl peroxide (benzoyl peroxide) 5 % external liquid Apply topically 2 (two) times a day      clindamycin (CLINDAGEL) 1 % gel Apply topically 2 (two) times a day      Multiple Vitamin (multivitamin) capsule Take 1 capsule by mouth daily      patient supplied medication TRETINOIN CREAM 0.05%      sulfamethoxazole-trimethoprim (BACTRIM DS) 800-160 mg per tablet Take 1 tablet by mouth 2 (two) times a day      clobetasol (TEMOVATE) 0.05 % cream Apply topically 2 (two) times a day (Patient not taking: Reported on 3/30/2022) 30 g 1    DOXYCYCLINE CALCIUM PO Take by mouth      methocarbamol (ROBAXIN) 750 mg tablet Take 1 tablet (750 mg total) by mouth every 6 (six) hours as needed for muscle spasms for up to 10 days 30 tablet 1    naproxen (NAPROSYN) 500 mg tablet Take 1 tablet (500 mg total) by mouth every 12 (twelve) hours as needed for moderate pain 20 tablet 0     No current facility-administered medications on file prior to visit.   No Known Allergies   Current Outpatient Medications on File Prior to Visit   Medication Sig Dispense Refill    benzoyl peroxide (benzoyl peroxide) 5 % external liquid Apply topically 2 (two) times a day      clindamycin (CLINDAGEL) 1 % gel Apply topically 2 (two) times a day      Multiple Vitamin (multivitamin) capsule Take 1 capsule by mouth daily      patient supplied medication TRETINOIN  "CREAM 0.05%      sulfamethoxazole-trimethoprim (BACTRIM DS) 800-160 mg per tablet Take 1 tablet by mouth 2 (two) times a day      clobetasol (TEMOVATE) 0.05 % cream Apply topically 2 (two) times a day (Patient not taking: Reported on 3/30/2022) 30 g 1    DOXYCYCLINE CALCIUM PO Take by mouth      methocarbamol (ROBAXIN) 750 mg tablet Take 1 tablet (750 mg total) by mouth every 6 (six) hours as needed for muscle spasms for up to 10 days 30 tablet 1    naproxen (NAPROSYN) 500 mg tablet Take 1 tablet (500 mg total) by mouth every 12 (twelve) hours as needed for moderate pain 20 tablet 0     No current facility-administered medications on file prior to visit.      Social History     Tobacco Use    Smoking status: Never    Smokeless tobacco: Never   Substance and Sexual Activity    Alcohol use: Yes     Comment: occasionally     Drug use: Never    Sexual activity: Not on file         Objective     /90   Pulse 81   Temp (!) 96.9 °F (36.1 °C) (Temporal)   Resp 18   Ht 5' 4\" (1.626 m)   Wt 73.5 kg (162 lb)   SpO2 98%   BMI 27.81 kg/m²     Physical Exam  Vitals and nursing note reviewed.   Constitutional:       General: He is not in acute distress.     Appearance: He is well-developed.   HENT:      Head: Normocephalic and atraumatic.   Eyes:      Conjunctiva/sclera: Conjunctivae normal.   Cardiovascular:      Rate and Rhythm: Normal rate and regular rhythm.      Heart sounds: No murmur heard.  Pulmonary:      Effort: Pulmonary effort is normal. No respiratory distress.      Breath sounds: Normal breath sounds.   Abdominal:      Palpations: Abdomen is soft.      Tenderness: There is no abdominal tenderness.   Genitourinary:     Comments: Normal external anal examination  Musculoskeletal:         General: No swelling.      Cervical back: Neck supple.   Skin:     General: Skin is warm and dry.      Capillary Refill: Capillary refill takes less than 2 seconds.   Neurological:      Mental Status: He is alert. "   Psychiatric:         Mood and Affect: Mood normal.       Administrative Statements   I have spent a total time of 20 minutes in caring for this patient on the day of the visit/encounter including Risks and benefits of tx options.

## 2024-09-17 NOTE — TELEPHONE ENCOUNTER
Patient's spouse called. States her  received call from office but he was not sure what it is about. I added her to his preferred numbers as he is Maltese speaking and it is easier to communicate via patient's wife. Warm transferred patient to office; they were looking to speak to him regarding his insurance.

## 2024-09-18 ENCOUNTER — OFFICE VISIT (OUTPATIENT)
Dept: SURGERY | Facility: CLINIC | Age: 34
End: 2024-09-18
Payer: COMMERCIAL

## 2024-09-18 VITALS
HEART RATE: 81 BPM | TEMPERATURE: 96.9 F | SYSTOLIC BLOOD PRESSURE: 142 MMHG | OXYGEN SATURATION: 98 % | WEIGHT: 162 LBS | RESPIRATION RATE: 18 BRPM | HEIGHT: 64 IN | BODY MASS INDEX: 27.66 KG/M2 | DIASTOLIC BLOOD PRESSURE: 90 MMHG

## 2024-09-18 DIAGNOSIS — K60.2 ANAL FISSURE: Primary | ICD-10-CM

## 2024-09-18 PROCEDURE — 99204 OFFICE O/P NEW MOD 45 MIN: CPT | Performed by: SURGERY

## 2024-09-18 RX ORDER — SODIUM CHLORIDE, SODIUM LACTATE, POTASSIUM CHLORIDE, CALCIUM CHLORIDE 600; 310; 30; 20 MG/100ML; MG/100ML; MG/100ML; MG/100ML
125 INJECTION, SOLUTION INTRAVENOUS CONTINUOUS
OUTPATIENT
Start: 2024-09-18

## 2024-09-18 NOTE — H&P
Ambulatory Visit  Name: Paulino Willis      : 1990      MRN: [id number]  Encounter Provider: Lemuel Varma MD  Encounter Date: 2024   Encounter department: [facility] GENERAL SURGERY [place]    Assessment & Plan  Anal fissure  Patient is a pleasant 34-year-old Georgian-speaking male presenting with signs and symptoms of a chronic anal fissure for which definitive treatment bilateral internal sphincterotomy is now indicated following failure of medical therapy.    The technical details of lateral internal sphincterotomy as well as the options benefits risks and alternatives were thoroughly discussed with the patient through the aid of a medical Georgian speaking.  Specific risks related to anesthesia, bleeding, infection and temporary or permanent fecal incontinence were explained in clear simple terms.    The patient's wife was present for the discussion.    Alternatives to surgery including the option for continued medical therapy or definitive treatment by colorectal surgery were discussed.    All questions answered to the satisfaction of the patient and informed written consent obtained to proceed.           History of Present Illness    Paulino Willis is a 34 y.o. male who presents with Hemorrhoids  and no history of having a c-scope. He states that he has had blood in the stool and itching for the past 2months he also has a history of an anal fissure. No fever/chills.[email or web address]    History obtained from : patient  Review of Systems   Constitutional:  Negative for chills and fever.   HENT:  Negative for ear pain and sore throat.    Eyes:  Negative for pain and visual disturbance.   Respiratory:  Negative for cough and shortness of breath.    Cardiovascular:  Negative for chest pain and palpitations.   Gastrointestinal:  Negative for abdominal pain and vomiting.   Genitourinary:  Negative for dysuria and hematuria.   Musculoskeletal:  Negative for arthralgias and back pain.    Skin:  Negative for color change and rash.   Neurological:  Negative for seizures and syncope.   All other systems reviewed and are negative.    Pertinent Medical History        Medical History Reviewed by provider this encounter:       Past Medical History  Past Medical History:   Diagnosis Date    Known health problems: none      Past Surgical History:   Procedure Laterality Date    ANKLE SURGERY Right     NECK SURGERY      submandibular surgery in [place], possible lymph node     No family history on file.  Current Outpatient Medications on File Prior to Visit   Medication Sig Dispense Refill    benzoyl peroxide (benzoyl peroxide) 5 % external liquid Apply topically 2 (two) times a day      clindamycin (CLINDAGEL) 1 % gel Apply topically 2 (two) times a day      Multiple Vitamin (multivitamin) capsule Take 1 capsule by mouth daily      patient supplied medication TRETINOIN CREAM 0.05%      sulfamethoxazole-trimethoprim (BACTRIM DS) 800-160 mg per tablet Take 1 tablet by mouth 2 (two) times a day      clobetasol (TEMOVATE) 0.05 % cream Apply topically 2 (two) times a day (Patient not taking: Reported on 3/30/2022) 30 g 1    DOXYCYCLINE CALCIUM PO Take by mouth      methocarbamol (ROBAXIN) 750 mg tablet Take 1 tablet (750 mg total) by mouth every 6 (six) hours as needed for muscle spasms for up to 10 days 30 tablet 1    naproxen (NAPROSYN) 500 mg tablet Take 1 tablet (500 mg total) by mouth every 12 (twelve) hours as needed for moderate pain 20 tablet 0     No current facility-administered medications on file prior to visit.   No Known Allergies   Current Outpatient Medications on File Prior to Visit   Medication Sig Dispense Refill    benzoyl peroxide (benzoyl peroxide) 5 % external liquid Apply topically 2 (two) times a day      clindamycin (CLINDAGEL) 1 % gel Apply topically 2 (two) times a day      Multiple Vitamin (multivitamin) capsule Take 1 capsule by mouth daily      patient supplied medication TRETINOIN  "CREAM 0.05%      sulfamethoxazole-trimethoprim (BACTRIM DS) 800-160 mg per tablet Take 1 tablet by mouth 2 (two) times a day      clobetasol (TEMOVATE) 0.05 % cream Apply topically 2 (two) times a day (Patient not taking: Reported on 3/30/2022) 30 g 1    DOXYCYCLINE CALCIUM PO Take by mouth      methocarbamol (ROBAXIN) 750 mg tablet Take 1 tablet (750 mg total) by mouth every 6 (six) hours as needed for muscle spasms for up to 10 days 30 tablet 1    naproxen (NAPROSYN) 500 mg tablet Take 1 tablet (500 mg total) by mouth every 12 (twelve) hours as needed for moderate pain 20 tablet 0     No current facility-administered medications on file prior to visit.      Social History     Tobacco Use    Smoking status: Never    Smokeless tobacco: Never   Substance and Sexual Activity    Alcohol use: Yes     Comment: occasionally     Drug use: Never    Sexual activity: Not on file         Objective    /90   Pulse 81   Temp (!) 96.9 °F (36.1 °C) (Temporal)   Resp 18   Ht 5' 4\" (1.626 m)   Wt 73.5 kg (162 lb)   SpO2 98%   BMI 27.81 kg/m²     Physical Exam  Vitals and nursing note reviewed.   Constitutional:       General: He is not in acute distress.     Appearance: He is well-developed.   HENT:      Head: Normocephalic and atraumatic.   Eyes:      Conjunctiva/sclera: Conjunctivae normal.   Cardiovascular:      Rate and Rhythm: Normal rate and regular rhythm.      Heart sounds: No murmur heard.  Pulmonary:      Effort: Pulmonary effort is normal. No respiratory distress.      Breath sounds: Normal breath sounds.   Abdominal:      Palpations: Abdomen is soft.      Tenderness: There is no abdominal tenderness.   Genitourinary:     Comments: Normal external anal examination  Musculoskeletal:         General: No swelling.      Cervical back: Neck supple.   Skin:     General: Skin is warm and dry.      Capillary Refill: Capillary refill takes less than 2 seconds.   Neurological:      Mental Status: He is alert. "   Psychiatric:         Mood and Affect: Mood normal.       Administrative Statements  I have spent a total time of 20 minutes in caring for this patient on the day of the visit/encounter including Risks and benefits of tx options.

## 2024-09-18 NOTE — ASSESSMENT & PLAN NOTE
Patient is a pleasant 34-year-old Brazilian-speaking male presenting with signs and symptoms of a chronic anal fissure for which definitive treatment bilateral internal sphincterotomy is now indicated following failure of medical therapy.    The technical details of lateral internal sphincterotomy as well as the options benefits risks and alternatives were thoroughly discussed with the patient through the aid of a medical Brazilian speaking.  Specific risks related to anesthesia, bleeding, infection and temporary or permanent fecal incontinence were explained in clear simple terms.    The patient's wife was present for the discussion.    Alternatives to surgery including the option for continued medical therapy or definitive treatment by colorectal surgery were discussed.    All questions answered to the satisfaction of the patient and informed written consent obtained to proceed.

## 2024-10-23 RX ORDER — MULTIVIT WITH MINERALS/LUTEIN
1000 TABLET ORAL EVERY MORNING
COMMUNITY

## 2024-10-23 NOTE — PRE-PROCEDURE INSTRUCTIONS
Pre-Surgery Instructions:   Medication Instructions    Ascorbic Acid (vitamin C) 1000 MG tablet Stop taking 7 days prior to surgery.    benzoyl peroxide (benzoyl peroxide) 5 % external liquid Hold day of surgery.    clindamycin (CLINDAGEL) 1 % gel Hold day of surgery.    Multiple Vitamin (multivitamin) capsule Stop taking 7 days prior to surgery.    patient supplied medication Take night before surgery    sulfamethoxazole-trimethoprim (BACTRIM DS) 800-160 mg per tablet Hold day of surgery.   Medication instructions for day surgery reviewed. Please use only a sip of water to take your instructed medications. Avoid all over the counter vitamins, supplements and NSAIDS for one week prior to surgery per anesthesia guidelines. Tylenol is ok to take as needed.     You will receive a call one business day prior to surgery with an arrival time and hospital directions. If your surgery is scheduled on a Monday, the hospital will be calling you on the Friday prior to your surgery. If you have not heard from anyone by 8pm, please call the hospital supervisor through the hospital  at [phone]. ([place] [phone] or [place] [phone]).    Do not eat or drink anything after midnight the night before your surgery, including candy, mints, lifesavers, or chewing gum. Do not drink alcohol 24hrs before your surgery. Try not to smoke at least 24hrs before your surgery.       Follow the pre surgery showering instructions as listed in the “My Surgical Experience Booklet” or otherwise provided by your surgeon's office. Do not use a blade to shave the surgical area 1 week before surgery. It is okay to use a clean electric clippers up to 24 hours before surgery. Do not apply any lotions, creams, including makeup, cologne, deodorant, or perfumes after showering on the day of your surgery. Do not use dry shampoo, hair spray, hair gel, or any type of hair products.     No contact lenses, eye make-up, or artificial eyelashes.  Remove nail polish, including gel polish, and any artificial, gel, or acrylic nails if possible. Remove all jewelry including rings and body piercing jewelry.     Wear causal clothing that is easy to take on and off. Consider your type of surgery.    Keep any valuables, jewelry, piercings at home. Please bring any specially ordered equipment (sling, braces) if indicated.    Arrange for a responsible person to drive you to and from the hospital on the day of your surgery. Please confirm the visitor policy for the day of your procedure when you receive your phone call with an arrival time.     Call the surgeon's office with any new illnesses, exposures, or additional questions prior to surgery.    Please reference your “My Surgical Experience Booklet” for additional information to prepare for your upcoming surgery.

## 2024-10-29 ENCOUNTER — HOSPITAL ENCOUNTER (OUTPATIENT)
Facility: HOSPITAL | Age: 34
Setting detail: OUTPATIENT SURGERY
Discharge: HOME | End: 2024-10-29
Attending: SURGERY | Admitting: SURGERY
Payer: COMMERCIAL

## 2024-10-29 ENCOUNTER — ANESTHESIA EVENT (OUTPATIENT)
Dept: PERIOP | Facility: HOSPITAL | Age: 34
End: 2024-10-29
Payer: COMMERCIAL

## 2024-10-29 ENCOUNTER — ANESTHESIA (OUTPATIENT)
Dept: PERIOP | Facility: HOSPITAL | Age: 34
End: 2024-10-29
Payer: COMMERCIAL

## 2024-10-29 VITALS
TEMPERATURE: 97.4 F | DIASTOLIC BLOOD PRESSURE: 72 MMHG | WEIGHT: 160 LBS | HEIGHT: 64 IN | RESPIRATION RATE: 18 BRPM | SYSTOLIC BLOOD PRESSURE: 118 MMHG | OXYGEN SATURATION: 97 % | BODY MASS INDEX: 27.31 KG/M2 | HEART RATE: 102 BPM

## 2024-10-29 DIAGNOSIS — K60.2 ANAL FISSURE: Primary | ICD-10-CM

## 2024-10-29 PROCEDURE — NC001 PR NO CHARGE: Performed by: SURGERY

## 2024-10-29 PROCEDURE — 46080 SPHNCTROTMY ANAL DIV SPHNCTR: CPT | Performed by: SURGERY

## 2024-10-29 PROCEDURE — 46080 SPHNCTROTMY ANAL DIV SPHNCTR: CPT | Performed by: PHYSICIAN ASSISTANT

## 2024-10-29 RX ORDER — HYDROMORPHONE HCL/PF 1 MG/ML
0.2 SYRINGE (ML) INJECTION
Status: DISCONTINUED | OUTPATIENT
Start: 2024-10-29 | End: 2024-10-29 | Stop reason: HOSPADM

## 2024-10-29 RX ORDER — BUPIVACAINE HYDROCHLORIDE 5 MG/ML
INJECTION, SOLUTION EPIDURAL; INTRACAUDAL AS NEEDED
Status: DISCONTINUED | OUTPATIENT
Start: 2024-10-29 | End: 2024-10-29 | Stop reason: HOSPADM

## 2024-10-29 RX ORDER — ROCURONIUM BROMIDE 10 MG/ML
INJECTION, SOLUTION INTRAVENOUS AS NEEDED
Status: DISCONTINUED | OUTPATIENT
Start: 2024-10-29 | End: 2024-10-29

## 2024-10-29 RX ORDER — CEFAZOLIN SODIUM 2 G/50ML
2000 SOLUTION INTRAVENOUS ONCE
Status: COMPLETED | OUTPATIENT
Start: 2024-10-29 | End: 2024-10-29

## 2024-10-29 RX ORDER — ONDANSETRON 2 MG/ML
4 INJECTION INTRAMUSCULAR; INTRAVENOUS ONCE AS NEEDED
Status: DISCONTINUED | OUTPATIENT
Start: 2024-10-29 | End: 2024-10-29 | Stop reason: HOSPADM

## 2024-10-29 RX ORDER — GLYCOPYRROLATE 0.2 MG/ML
INJECTION INTRAMUSCULAR; INTRAVENOUS AS NEEDED
Status: DISCONTINUED | OUTPATIENT
Start: 2024-10-29 | End: 2024-10-29

## 2024-10-29 RX ORDER — SODIUM CHLORIDE, SODIUM LACTATE, POTASSIUM CHLORIDE, CALCIUM CHLORIDE 600; 310; 30; 20 MG/100ML; MG/100ML; MG/100ML; MG/100ML
125 INJECTION, SOLUTION INTRAVENOUS CONTINUOUS
Status: DISCONTINUED | OUTPATIENT
Start: 2024-10-29 | End: 2024-10-29 | Stop reason: HOSPADM

## 2024-10-29 RX ORDER — LIDOCAINE HYDROCHLORIDE 20 MG/ML
JELLY TOPICAL AS NEEDED
Qty: 30 ML | Refills: 0 | Status: SHIPPED | OUTPATIENT
Start: 2024-10-29

## 2024-10-29 RX ORDER — ONDANSETRON 2 MG/ML
INJECTION INTRAMUSCULAR; INTRAVENOUS AS NEEDED
Status: DISCONTINUED | OUTPATIENT
Start: 2024-10-29 | End: 2024-10-29

## 2024-10-29 RX ORDER — OXYCODONE HYDROCHLORIDE 5 MG/1
7.5 TABLET ORAL EVERY 6 HOURS PRN
Status: DISCONTINUED | OUTPATIENT
Start: 2024-10-29 | End: 2024-10-29 | Stop reason: HOSPADM

## 2024-10-29 RX ORDER — SODIUM CHLORIDE, SODIUM LACTATE, POTASSIUM CHLORIDE, CALCIUM CHLORIDE 600; 310; 30; 20 MG/100ML; MG/100ML; MG/100ML; MG/100ML
20 INJECTION, SOLUTION INTRAVENOUS CONTINUOUS
Status: DISCONTINUED | OUTPATIENT
Start: 2024-10-29 | End: 2024-10-29 | Stop reason: HOSPADM

## 2024-10-29 RX ORDER — ALBUTEROL SULFATE 90 UG/1
INHALANT RESPIRATORY (INHALATION) AS NEEDED
Status: DISCONTINUED | OUTPATIENT
Start: 2024-10-29 | End: 2024-10-29

## 2024-10-29 RX ORDER — SODIUM CHLORIDE, SODIUM LACTATE, POTASSIUM CHLORIDE, CALCIUM CHLORIDE 600; 310; 30; 20 MG/100ML; MG/100ML; MG/100ML; MG/100ML
75 INJECTION, SOLUTION INTRAVENOUS CONTINUOUS
Status: DISCONTINUED | OUTPATIENT
Start: 2024-10-29 | End: 2024-10-29 | Stop reason: HOSPADM

## 2024-10-29 RX ORDER — PHENYLEPHRINE HCL IN 0.9% NACL 1 MG/10 ML
SYRINGE (ML) INTRAVENOUS AS NEEDED
Status: DISCONTINUED | OUTPATIENT
Start: 2024-10-29 | End: 2024-10-29

## 2024-10-29 RX ORDER — KETOROLAC TROMETHAMINE 30 MG/ML
INJECTION, SOLUTION INTRAMUSCULAR; INTRAVENOUS AS NEEDED
Status: DISCONTINUED | OUTPATIENT
Start: 2024-10-29 | End: 2024-10-29

## 2024-10-29 RX ORDER — FENTANYL CITRATE 50 UG/ML
INJECTION, SOLUTION INTRAMUSCULAR; INTRAVENOUS AS NEEDED
Status: DISCONTINUED | OUTPATIENT
Start: 2024-10-29 | End: 2024-10-29

## 2024-10-29 RX ORDER — SODIUM CHLORIDE, SODIUM LACTATE, POTASSIUM CHLORIDE, CALCIUM CHLORIDE 600; 310; 30; 20 MG/100ML; MG/100ML; MG/100ML; MG/100ML
INJECTION, SOLUTION INTRAVENOUS CONTINUOUS PRN
Status: DISCONTINUED | OUTPATIENT
Start: 2024-10-29 | End: 2024-10-29

## 2024-10-29 RX ORDER — DEXAMETHASONE SODIUM PHOSPHATE 10 MG/ML
INJECTION, SOLUTION INTRAMUSCULAR; INTRAVENOUS AS NEEDED
Status: DISCONTINUED | OUTPATIENT
Start: 2024-10-29 | End: 2024-10-29

## 2024-10-29 RX ORDER — MAGNESIUM HYDROXIDE 1200 MG/15ML
LIQUID ORAL AS NEEDED
Status: DISCONTINUED | OUTPATIENT
Start: 2024-10-29 | End: 2024-10-29 | Stop reason: HOSPADM

## 2024-10-29 RX ORDER — DOCUSATE SODIUM 100 MG/1
100 CAPSULE, LIQUID FILLED ORAL 2 TIMES DAILY
Qty: 14 CAPSULE | Refills: 0 | Status: SHIPPED | OUTPATIENT
Start: 2024-10-29 | End: 2024-11-05

## 2024-10-29 RX ORDER — OXYCODONE HYDROCHLORIDE 5 MG/1
5 TABLET ORAL EVERY 6 HOURS PRN
Qty: 6 TABLET | Refills: 0 | Status: SHIPPED | OUTPATIENT
Start: 2024-10-29 | End: 2024-11-08

## 2024-10-29 RX ORDER — MIDAZOLAM HYDROCHLORIDE 2 MG/2ML
INJECTION, SOLUTION INTRAMUSCULAR; INTRAVENOUS AS NEEDED
Status: DISCONTINUED | OUTPATIENT
Start: 2024-10-29 | End: 2024-10-29

## 2024-10-29 RX ORDER — FENTANYL CITRATE/PF 50 MCG/ML
50 SYRINGE (ML) INJECTION
Status: DISCONTINUED | OUTPATIENT
Start: 2024-10-29 | End: 2024-10-29 | Stop reason: HOSPADM

## 2024-10-29 RX ORDER — PROPOFOL 10 MG/ML
INJECTION, EMULSION INTRAVENOUS AS NEEDED
Status: DISCONTINUED | OUTPATIENT
Start: 2024-10-29 | End: 2024-10-29

## 2024-10-29 RX ORDER — ACETAMINOPHEN 325 MG/1
650 TABLET ORAL EVERY 6 HOURS PRN
Status: DISCONTINUED | OUTPATIENT
Start: 2024-10-29 | End: 2024-10-29 | Stop reason: HOSPADM

## 2024-10-29 RX ORDER — ONDANSETRON 2 MG/ML
4 INJECTION INTRAMUSCULAR; INTRAVENOUS EVERY 6 HOURS PRN
Status: DISCONTINUED | OUTPATIENT
Start: 2024-10-29 | End: 2024-10-29 | Stop reason: HOSPADM

## 2024-10-29 RX ORDER — OXYCODONE HYDROCHLORIDE 5 MG/1
5 TABLET ORAL EVERY 6 HOURS PRN
Status: DISCONTINUED | OUTPATIENT
Start: 2024-10-29 | End: 2024-10-29 | Stop reason: HOSPADM

## 2024-10-29 RX ADMIN — ROCURONIUM BROMIDE 50 MG: 10 INJECTION, SOLUTION INTRAVENOUS at 11:02

## 2024-10-29 RX ADMIN — ACETAMINOPHEN 650 MG: 325 TABLET ORAL at 13:10

## 2024-10-29 RX ADMIN — SODIUM CHLORIDE, SODIUM LACTATE, POTASSIUM CHLORIDE, AND CALCIUM CHLORIDE: .6; .31; .03; .02 INJECTION, SOLUTION INTRAVENOUS at 11:38

## 2024-10-29 RX ADMIN — SUGAMMADEX 100 MG: 100 INJECTION, SOLUTION INTRAVENOUS at 11:48

## 2024-10-29 RX ADMIN — GLYCOPYRROLATE 0.2 MG: 0.2 INJECTION INTRAMUSCULAR; INTRAVENOUS at 11:02

## 2024-10-29 RX ADMIN — ALBUTEROL SULFATE 2 PUFF: 90 AEROSOL, METERED RESPIRATORY (INHALATION) at 12:02

## 2024-10-29 RX ADMIN — FENTANYL CITRATE 50 MCG: 50 INJECTION, SOLUTION INTRAMUSCULAR; INTRAVENOUS at 11:02

## 2024-10-29 RX ADMIN — KETOROLAC TROMETHAMINE 30 MG: 30 INJECTION, SOLUTION INTRAMUSCULAR; INTRAVENOUS at 11:37

## 2024-10-29 RX ADMIN — ONDANSETRON 4 MG: 2 INJECTION INTRAMUSCULAR; INTRAVENOUS at 11:02

## 2024-10-29 RX ADMIN — PROPOFOL 200 MG: 10 INJECTION, EMULSION INTRAVENOUS at 11:02

## 2024-10-29 RX ADMIN — Medication 50 MCG: at 11:56

## 2024-10-29 RX ADMIN — SUGAMMADEX 200 MG: 100 INJECTION, SOLUTION INTRAVENOUS at 11:39

## 2024-10-29 RX ADMIN — CEFAZOLIN SODIUM 2000 MG: 2 SOLUTION INTRAVENOUS at 11:01

## 2024-10-29 RX ADMIN — FENTANYL CITRATE 50 MCG: 50 INJECTION, SOLUTION INTRAMUSCULAR; INTRAVENOUS at 11:08

## 2024-10-29 RX ADMIN — DEXAMETHASONE SODIUM PHOSPHATE 7 MG: 10 INJECTION, SOLUTION INTRAMUSCULAR; INTRAVENOUS at 11:02

## 2024-10-29 RX ADMIN — SODIUM CHLORIDE, SODIUM LACTATE, POTASSIUM CHLORIDE, AND CALCIUM CHLORIDE: .6; .31; .03; .02 INJECTION, SOLUTION INTRAVENOUS at 11:00

## 2024-10-29 RX ADMIN — MIDAZOLAM 2 MG: 1 INJECTION INTRAMUSCULAR; INTRAVENOUS at 11:00

## 2024-10-29 NOTE — H&P
H&P - Surgery-General   Name: Paulino Willis 34 y.o. male I MRN: [id number]  Unit/Bed#: OR [place] I Date of Admission: 10/29/2024   Date of Service: 10/29/2024 I Hospital Day: 0     Assessment & Plan  Anal fissure      History of Present Illness   Paulino Willis is a 34 y.o. male who presents with a chronic anal fissure for definitive treatment by lateral internal sphincterotomy..    Review of Systems   Constitutional:  Negative for chills and fever.   HENT:  Negative for ear pain and sore throat.    Eyes:  Negative for pain and visual disturbance.   Respiratory:  Negative for cough and shortness of breath.    Cardiovascular:  Negative for chest pain and palpitations.   Gastrointestinal:  Negative for abdominal pain and vomiting.   Genitourinary:  Negative for dysuria and hematuria.   Musculoskeletal:  Negative for arthralgias and back pain.   Skin:  Negative for color change and rash.   Neurological:  Negative for seizures and syncope.   All other systems reviewed and are negative.    I have reviewed the patient's PMH, PSH, Social History, Family History, Meds, and Allergies    Objective :  Temp:  [97.9 °F (36.6 °C)] 97.9 °F (36.6 °C)  HR:  [86] 86  BP: (112)/(62) 112/62  Resp:  [18] 18  SpO2:  [98 %] 98 %  O2 Device: None (Room air)      Physical Exam  Vitals and nursing note reviewed.   Constitutional:       General: He is not in acute distress.     Appearance: He is well-developed.   HENT:      Head: Normocephalic and atraumatic.   Eyes:      Conjunctiva/sclera: Conjunctivae normal.   Cardiovascular:      Rate and Rhythm: Normal rate and regular rhythm.      Heart sounds: No murmur heard.  Pulmonary:      Effort: Pulmonary effort is normal. No respiratory distress.      Breath sounds: Normal breath sounds.   Abdominal:      Palpations: Abdomen is soft.      Tenderness: There is no abdominal tenderness.   Genitourinary:     Comments: Chronic anal fissure present  Musculoskeletal:         General:  "No swelling.      Cervical back: Neck supple.   Skin:     General: Skin is warm and dry.      Capillary Refill: Capillary refill takes less than 2 seconds.   Neurological:      Mental Status: He is alert.   Psychiatric:         Mood and Affect: Mood normal.         Lab Results: I have reviewed the following results:  No results for input(s): \"WBC\", \"HGB\", \"HCT\", \"PLT\", \"BANDSPCT\", \"SODIUM\", \"K\", \"CL\", \"CO2\", \"BUN\", \"CREATININE\", \"GLUC\", \"CAIONIZED\", \"MG\", \"PHOS\", \"AST\", \"ALT\", \"ALB\", \"TBILI\", \"DBILI\", \"ALKPHOS\", \"PTT\", \"INR\", \"HSTNI0\", \"HSTNI2\", \"BNP\", \"LACTICACID\" in the last 72 hours.    Imaging Results Review: No pertinent imaging studies reviewed.  Other Study Results Review: No additional pertinent studies reviewed.    VTE Pharmacologic Prophylaxis: Heparin  VTE Mechanical Prophylaxis: sequential compression device    Administrative Statements   I have spent a total time of 15 minutes in caring for this patient on the day of the visit/encounter including Risks and benefits of tx options.  "

## 2024-10-29 NOTE — OP NOTE
OPERATIVE REPORT  PATIENT NAME: Paulino Willis    :  1990  MRN: [id number]  Pt Location: [facility] OR ROOM 01    SURGERY DATE: 10/29/2024    Surgeons and Role:     * Lemuel Varma MD - Primary     * Rush Spencer PA-C - Assisting  The PA was necessary to provide expert assistance; i.e. in the form of providing optimal exposure with retraction, suturing, and assistance with dissection in order to perform the most efficient operation and in order to optimize patient safety in the abscence of a qualified surgical resident.    Preop Diagnosis:  Anal fissure [K60.2]    Post-Op Diagnosis Codes:     * Anal fissure [K60.2]    Procedure(s):  SPHINCTEROTOMY    Specimen(s):  * No specimens in log *    Estimated Blood Loss:   Minimal    Drains:  * No LDAs found *    Anesthesia Type:   General    Operative Indications:  Anal fissure [K60.2]  The patient is a 34-year-old presenting with a classic history of an acute anal fissure for which definitive diagnosis and treatment by lateral internal sphincterotomy is now indicated.    Operative Findings:  After the induction of general anesthesia and placement of the patient in the prone position anoscopy was performed.  This revealed well-healed anal fissure.    A left lateral internal sphincterotomy performed in the routine fashion and the procedure completed without event.    Complications:   None    Procedure and Technique:  Patient taken to the operating room where they were properly identified monitored and anesthetized.  They received antibiotics perioperatively.  Sequential compression device used for DVT prophylaxis.  The patient rolled into the prone position.  Perineum prepped and draped under sterile conditions using aseptic technique.  Timeout performed.    Digital rectal examination performed.  Sphincter mechanism easily palpated.    Anoscopy performed with use of the Hill-Sotelo retractor with the above findings noted.    The endoderm  infiltrated with half percent Marcaine.  The anoderm incised.  Subcutaneous tissues were spread bluntly with hemostat.  Hemostasis ensured with electrocautery.    The fibers of the internal sphincter readily identified.  Hemostat passed through a portion of the internal sphincter and those muscle fibers were divided with electrocautery.    Procedure concluded with closure of the anoderm with a running 3-0 chromic suture.    Pudendal nerve block performed with use of half percent bupivacaine.  Wounds dressed.  Patient rolled back supine.  Extubated and taken to recovery in stable condition.    I was present for the entire procedure.    Patient Disposition:  PACU              SIGNATURE: Lemuel Varma MD  DATE: October 29, 2024  TIME: 11:41 AM

## 2024-10-29 NOTE — DISCHARGE INSTR - AVS FIRST PAGE
[facility]    Discharge Instructions  Light activity for 2 weeks.  No driving until pain is well controlled.  Remove dressing tomorrow or with your first bowel movement.  Gently clean the area once daily and after bowel movement.  Use gentle baby wipes instead of toilet paper for the first week.  There is a suture you may feel with your hand, this will fall out within 2 weeks.  Use stool softener until your first bowel movement.  Notify our office for nausea, vomiting, fever, diarrhea, chest pain, trouble breathing.  Follow up in our office in 2 weeks or sooner if needed.  Call with additional questions or concerns [phone].    For pain you may take ibuprofen 600 mg every 6 hours scheduled for 3 days then as needed.  You can also take acetaminophen 650 mg every 6 hours scheduled for 3 days then as needed.  For ongoing pain you can alternate ibuprofen and acetaminophen every 3 hours.  If pain not controlled with this regimen you can use Oxycodone as prescribed.  Ice packs may be helpful, 20 min on, 20 min off alternating and monitoring skin.

## 2024-10-29 NOTE — ANESTHESIA PREPROCEDURE EVALUATION
Procedure:  SPHINCTEROTOMY (Anus)    Relevant Problems   Surgery/Wound/Pain   (+) Paresthesia and pain of both upper extremities      Orthopedic/Musculoskeletal   (+) Cervical radiculopathy      FEN/Gastrointestinal   (+) Anal fissure      Dermatology   (+) Acne      Other   (+) Elevated liver enzymes      Lab Results   Component Value Date    SODIUM 136 04/24/2024    K 3.4 (L) 04/24/2024     04/24/2024    CO2 27 04/24/2024    AGAP 9 04/24/2024    BUN 8 04/24/2024    CREATININE 0.71 04/24/2024    GLUC 104 (H) 04/24/2024    GLUF 99 03/30/2022    CALCIUM 9.9 04/24/2024    AST 29 04/24/2024    ALT 54 04/24/2024    ALKPHOS 94 04/24/2024    TP 8.1 04/24/2024    TBILI 0.6 04/24/2024    EGFR 125 04/24/2024         Physical Exam    Airway    Mallampati score: II  TM Distance: >3 FB  Neck ROM: full     Dental       Cardiovascular      Pulmonary      Other Findings        Anesthesia Plan  ASA Score- 2     Anesthesia Type- general with ASA Monitors.         Additional Monitors:     Airway Plan: ETT.           Plan Factors-Exercise tolerance (METS): >4 METS.    Chart reviewed. EKG reviewed. Imaging results reviewed. Existing labs reviewed. Patient summary reviewed.                  Induction- intravenous.    Postoperative Plan-         Informed Consent- Anesthetic plan and risks discussed with patient.  I personally reviewed this patient with the CRNA. Discussed and agreed on the Anesthesia Plan with the CRNA..

## 2024-10-30 ENCOUNTER — OFFICE VISIT (OUTPATIENT)
Dept: SURGERY | Facility: CLINIC | Age: 34
End: 2024-10-30

## 2024-10-30 VITALS
TEMPERATURE: 98.1 F | HEIGHT: 64 IN | DIASTOLIC BLOOD PRESSURE: 60 MMHG | BODY MASS INDEX: 27.66 KG/M2 | RESPIRATION RATE: 16 BRPM | SYSTOLIC BLOOD PRESSURE: 98 MMHG | HEART RATE: 84 BPM | WEIGHT: 162 LBS | OXYGEN SATURATION: 98 %

## 2024-10-30 DIAGNOSIS — H11.31 SUBCONJUNCTIVAL HEMORRHAGE OF RIGHT EYE: Primary | ICD-10-CM

## 2024-10-30 PROCEDURE — 99024 POSTOP FOLLOW-UP VISIT: CPT | Performed by: SURGERY

## 2024-10-30 NOTE — PROGRESS NOTES
Ambulatory Visit  Name: Paulino Willis      : 1990      MRN: [id number]  Encounter Provider: Lemuel Varma MD  Encounter Date: 10/30/2024   Encounter department: [facility] GENERAL SURGERY [place]    Assessment & Plan  Subconjunctival hemorrhage of right eye  Pleasant 34-year-old male postop day 1 status post left lateral internal sphincterotomy for the definitive treatment of his chronic anal fissure presenting with a burst blood vessel in his right thigh for medical evaluation.    According the patient he noted a ruptured blood vessel in his right eye.  He denies any complaints of pain or change in vision with this.    On physical examination he is pleasant well-nourished well-developed male.  Competent reliable as a historian.  Inspection of the right eye confirms the presence of a subcu conjunctival hemorrhage likely as a consequence of coughing during extubation.  Interrogation of his visual acuity finds that it is preserved.    The diagnosis explained to the patient and his wife.  The natural history of the condition outlined.  They have been reassured that he is safe and well and that no additional investigations or interventions are indicated.    I look forward to seeing them back for the routine scheduled follow-up.           History of Present Illness     Paulino Willis is a 34 y.o. male who presents redness on his eye that developed in the hospital after waking from surgery  . Pt denies blurred visions, dizziness, or headaches. No fevers/chills. Amilia.O,MA    History obtained from : patient  Review of Systems   Constitutional:  Negative for chills and fever.   HENT:  Negative for ear pain and sore throat.    Eyes:  Negative for pain and visual disturbance.   Respiratory:  Negative for cough and shortness of breath.    Cardiovascular:  Negative for chest pain and palpitations.   Gastrointestinal:  Negative for abdominal pain and vomiting.   Genitourinary:  Negative for  dysuria and hematuria.   Musculoskeletal:  Negative for arthralgias and back pain.   Skin:  Negative for color change and rash.   Neurological:  Negative for seizures and syncope.   All other systems reviewed and are negative.    Pertinent Medical History         Medical History Reviewed by provider this encounter:       Past Medical History   Past Medical History:   Diagnosis Date    Acne     Anal fissure     COVID     2023    Uses Chilean as primary spoken language      Past Surgical History:   Procedure Laterality Date    ANKLE SURGERY Right     NECK SURGERY      submandibular surgery in [place], possible lymph node     History reviewed. No pertinent family history.  Current Outpatient Medications on File Prior to Visit   Medication Sig Dispense Refill    Ascorbic Acid (vitamin C) 1000 MG tablet Take 1,000 mg by mouth every morning      benzoyl peroxide (benzoyl peroxide) 5 % external liquid Apply topically every morning      clindamycin (CLINDAGEL) 1 % gel Apply topically every morning      docusate sodium (COLACE) 100 mg capsule Take 1 capsule (100 mg total) by mouth 2 (two) times a day for 7 days Stop taking for diarrhea. To help reduce pain from bowel movements after surgery. 14 capsule 0    lidocaine (XYLOCAINE) 2 % topical gel Apply topically as needed for mild pain 30 mL 0    Multiple Vitamin (multivitamin) capsule Take 1 capsule by mouth daily Few x week      oxyCODONE (Roxicodone) 5 immediate release tablet Take 1 tablet (5 mg total) by mouth every 6 (six) hours as needed for severe pain for up to 10 days Max Daily Amount: 20 mg 6 tablet 0    patient supplied medication TRETINOIN CREAM 0.05%  2X weekly evening      sulfamethoxazole-trimethoprim (BACTRIM DS) 800-160 mg per tablet Take 1 tablet by mouth 2 (two) times a day       Current Facility-Administered Medications on File Prior to Visit   Medication Dose Route Frequency Provider Last Rate Last Admin    [DISCONTINUED] acetaminophen (TYLENOL) tablet 650 mg   650 mg Oral Q6H PRN Rush Dimitriamarybeth, PA-C   650 mg at 10/29/24 1310    [DISCONTINUED] fentaNYL (SUBLIMAZE) injection 50 mcg  50 mcg Intravenous Q5 Min PRN Lemuel Griffith CRNA        [DISCONTINUED] HYDROmorphone (DILAUDID) injection 0.2 mg  0.2 mg Intravenous Q3H PRN Rush Dimitriamarybeth, PA-C        [DISCONTINUED] lactated ringers bolus 1,000 mL  1,000 mL Intravenous Once PRN Rush Dimitriadis, PA-C        [DISCONTINUED] lactated ringers bolus 1,000 mL  1,000 mL Intravenous Once PRN Rush Dimitriadis, PA-C        [DISCONTINUED] lactated ringers infusion  125 mL/hr Intravenous Continuous Lemuel Varma MD   Stopped at 10/29/24 1335    [DISCONTINUED] lactated ringers infusion  20 mL/hr Intravenous Continuous Lemuel Griffith CRNA        [DISCONTINUED] lactated ringers infusion  75 mL/hr Intravenous Continuous Rush Spencer PA-C        [DISCONTINUED] ondansetron (ZOFRAN) injection 4 mg  4 mg Intravenous Once PRN Lemuel Griffith CRNA        [DISCONTINUED] ondansetron (ZOFRAN) injection 4 mg  4 mg Intravenous Q6H PRN Rushcheko Venturaiamarybeth, PA-C        [DISCONTINUED] oxyCODONE (ROXICODONE) IR tablet 5 mg  5 mg Oral Q6H PRN Rush Dimitriadis, PA-C        [DISCONTINUED] oxyCODONE (ROXICODONE) IR tablet 7.5 mg  7.5 mg Oral Q6H PRN Rush Lorenzoiadis, PA-C       No Known Allergies   Current Outpatient Medications on File Prior to Visit   Medication Sig Dispense Refill    Ascorbic Acid (vitamin C) 1000 MG tablet Take 1,000 mg by mouth every morning      benzoyl peroxide (benzoyl peroxide) 5 % external liquid Apply topically every morning      clindamycin (CLINDAGEL) 1 % gel Apply topically every morning      docusate sodium (COLACE) 100 mg capsule Take 1 capsule (100 mg total) by mouth 2 (two) times a day for 7 days Stop taking for diarrhea. To help reduce pain from bowel movements after surgery. 14 capsule 0    lidocaine (XYLOCAINE) 2 % topical gel Apply topically as needed  for mild pain 30 mL 0    Multiple Vitamin (multivitamin) capsule Take 1 capsule by mouth daily Few x week      oxyCODONE (Roxicodone) 5 immediate release tablet Take 1 tablet (5 mg total) by mouth every 6 (six) hours as needed for severe pain for up to 10 days Max Daily Amount: 20 mg 6 tablet 0    patient supplied medication TRETINOIN CREAM 0.05%  2X weekly evening      sulfamethoxazole-trimethoprim (BACTRIM DS) 800-160 mg per tablet Take 1 tablet by mouth 2 (two) times a day       Current Facility-Administered Medications on File Prior to Visit   Medication Dose Route Frequency Provider Last Rate Last Admin    [DISCONTINUED] acetaminophen (TYLENOL) tablet 650 mg  650 mg Oral Q6H PRN Rush Tj PAFabienC   650 mg at 10/29/24 1310    [DISCONTINUED] fentaNYL (SUBLIMAZE) injection 50 mcg  50 mcg Intravenous Q5 Min PRN Lemuel Griffith CRNA        [DISCONTINUED] HYDROmorphone (DILAUDID) injection 0.2 mg  0.2 mg Intravenous Q3H PRN Rush Spencer PA-C        [DISCONTINUED] lactated ringers bolus 1,000 mL  1,000 mL Intravenous Once PRN Rush Dimngaiamarybeth, PA-C        [DISCONTINUED] lactated ringers bolus 1,000 mL  1,000 mL Intravenous Once PRN Rush Spencer PA-C        [DISCONTINUED] lactated ringers infusion  125 mL/hr Intravenous Continuous Lemuel Varma MD   Stopped at 10/29/24 1335    [DISCONTINUED] lactated ringers infusion  20 mL/hr Intravenous Continuous Lemuel Griffith CRNA        [DISCONTINUED] lactated ringers infusion  75 mL/hr Intravenous Continuous Rush Spencer PA-C        [DISCONTINUED] ondansetron (ZOFRAN) injection 4 mg  4 mg Intravenous Once PRN Lemuel Griffith CRNA        [DISCONTINUED] ondansetron (ZOFRAN) injection 4 mg  4 mg Intravenous Q6H PRN Rush Spencer PA-AVRIL        [DISCONTINUED] oxyCODONE (ROXICODONE) IR tablet 5 mg  5 mg Oral Q6H PRN Rush Venturaiamarybeth PA-C        [DISCONTINUED] oxyCODONE (ROXICODONE) IR tablet 7.5 mg  7.5 mg  "Oral Q6H PRN Rush Spencer PA-C          Social History     Tobacco Use    Smoking status: Never    Smokeless tobacco: Never   Substance and Sexual Activity    Alcohol use: Yes     Comment: occasionally     Drug use: Never    Sexual activity: Not on file         Objective     BP 98/60 (BP Location: Left arm, Patient Position: Sitting, Cuff Size: Standard)   Pulse 84   Temp 98.1 °F (36.7 °C) (Temporal)   Resp 16   Ht 5' 4\" (1.626 m)   Wt 73.5 kg (162 lb)   SpO2 98%   BMI 27.81 kg/m²     Physical Exam  Vitals and nursing note reviewed.   Constitutional:       General: He is not in acute distress.     Appearance: He is well-developed.   HENT:      Head: Normocephalic and atraumatic.      Comments: Eye exam as described above  Eyes:      Conjunctiva/sclera: Conjunctivae normal.   Cardiovascular:      Rate and Rhythm: Normal rate and regular rhythm.      Heart sounds: No murmur heard.  Pulmonary:      Effort: Pulmonary effort is normal. No respiratory distress.      Breath sounds: Normal breath sounds.   Abdominal:      Palpations: Abdomen is soft.      Tenderness: There is no abdominal tenderness.   Musculoskeletal:         General: No swelling.      Cervical back: Neck supple.   Skin:     General: Skin is warm and dry.      Capillary Refill: Capillary refill takes less than 2 seconds.   Neurological:      Mental Status: He is alert.   Psychiatric:         Mood and Affect: Mood normal.       Administrative Statements   I have spent a total time of 15 minutes in caring for this patient on the day of the visit/encounter including Patient and family education.  "

## 2024-10-30 NOTE — ASSESSMENT & PLAN NOTE
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    According the patient he noted a ruptured blood vessel in his right eye.  He denies any complaints of pain or change in vision with this.    On physical examination he is pleasant well-nourished well-developed male.  Competent reliable as a historian.  Inspection of the right eye confirms the presence of a subcu conjunctival hemorrhage likely as a consequence of coughing during extubation.  Interrogation of his visual acuity finds that it is preserved.    The diagnosis explained to the patient and his wife.  The natural history of the condition outlined.  They have been reassured that he is safe and well and that no additional investigations or interventions are indicated.    I look forward to seeing them back for the routine scheduled follow-up.

## 2024-11-01 NOTE — ANESTHESIA POSTPROCEDURE EVALUATION
Post-Op Assessment Note    CV Status:  Stable    Pain management: adequate       Mental Status:  Alert and awake   Hydration Status:  Euvolemic   PONV Controlled:  Controlled   Airway Patency:  Patent     Post Op Vitals Reviewed: Yes    No anethesia notable event occurred.    Staff: Anesthesiologist           Last Filed PACU Vitals:  Vitals Value Taken Time   Temp 97.4 °F (36.3 °C) 10/29/24 1209   Pulse 104 10/29/24 1239   /69 10/29/24 1239   Resp 19 10/29/24 1239   SpO2 98 % 10/29/24 1239       Modified Radha:  No data recorded

## 2024-11-11 NOTE — PROGRESS NOTES
Ambulatory Visit  Name: Paulino Willis      : 1990      MRN: [id number]  Encounter Provider: Rush Spencer PA-C  Encounter Date: 2024   Encounter department: [facility] GENERAL SURGERY [place]    Assessment & Plan  Anal fissure  Overall doing ok 2 weeks out from sphincterotomy for history of anal fissure. Has had recurrent constipation after finishing Colace x 1 week. Having hard stools with some blood per rectum. On exam the incision is healing well, no signs of infection or hematoma. I advised continued stool softener use and fiber supplementation. Will plan to re-assess in about 1 month. Copy of op note provided, questions answered, agreeable to the plan.    Orders:    docusate sodium (COLACE) 100 mg capsule; Take 1 capsule (100 mg total) by mouth 2 (two) times a day for 14 days Stop taking for diarrhea. To help reduce pain from bowel movements after surgery.    lidocaine (XYLOCAINE) 2 % topical gel; Apply topically as needed for mild pain      History of Present Illness     Paulino Willis is a 34 y.o. male who presents for his po visit after having a lateral interal sphinceterotomy 10/29/2024/ Subconjunctival hemorrhage of right eye after surgery, was seen by Dr. Varma on 10/30/24. Pt states the eyes cleared up. Pt states he had some rectal bleeding yesterday when wiping but he did start his first day back at work yesterday and continues to have constipation. Pt states he occasionally has rectal pain. Pt denies nausea/vomiting, diarrhea/constipation, issues with urination, trouble eating/drinking/swallowing or fevers/chills. [email or web address]  History obtained from : patient  Review of Systems   All other systems reviewed and are negative.    Past Medical History   Past Medical History:   Diagnosis Date    Acne     Anal fissure     COVID         Uses [place] Islander as primary spoken language      Past Surgical History:   Procedure Laterality Date    ANKLE SURGERY Right   "   NECK SURGERY      submandibular surgery in [place], possible lymph node    [place] SPHINCTEROTOMY ANAL DIVISION SPHINCTER SPX N/A 10/29/2024    Procedure: SPHINCTEROTOMY;  Surgeon: Lemuel Varma MD;  Location: [facility] OR;  Service: General     History reviewed. No pertinent family history.  Current Outpatient Medications on File Prior to Visit   Medication Sig Dispense Refill    Ascorbic Acid (vitamin C) 1000 MG tablet Take 1,000 mg by mouth every morning      benzoyl peroxide (benzoyl peroxide) 5 % external liquid Apply topically every morning      clindamycin (CLINDAGEL) 1 % gel Apply topically every morning      Multiple Vitamin (multivitamin) capsule Take 1 capsule by mouth daily Few x week      patient supplied medication TRETINOIN CREAM 0.05%  2X weekly evening      sulfamethoxazole-trimethoprim (BACTRIM DS) 800-160 mg per tablet Take 1 tablet by mouth 2 (two) times a day      [DISCONTINUED] lidocaine (XYLOCAINE) 2 % topical gel Apply topically as needed for mild pain 30 mL 0    [DISCONTINUED] docusate sodium (COLACE) 100 mg capsule Take 1 capsule (100 mg total) by mouth 2 (two) times a day for 7 days Stop taking for diarrhea. To help reduce pain from bowel movements after surgery. 14 capsule 0     No current facility-administered medications on file prior to visit.   No Known Allergies       Objective     /70 (BP Location: Left arm, Patient Position: Sitting, Cuff Size: Standard)   Pulse 80   Temp 98.9 °F (37.2 °C) (Temporal)   Resp 16   Ht 5' 4\" (1.626 m)   Wt 72.6 kg (160 lb)   SpO2 98%   BMI 27.46 kg/m²     Physical Exam  Vitals and nursing note reviewed.   Constitutional:       General: He is not in acute distress.     Appearance: He is well-developed. He is not diaphoretic.   HENT:      Head: Normocephalic and atraumatic.   Eyes:      Conjunctiva/sclera: Conjunctivae normal.      Pupils: Pupils are equal, round, and reactive to light.   Pulmonary:      Effort: No respiratory distress. "   Genitourinary:     Comments: Incision healing well, no hematoma or infection.  Musculoskeletal:         General: Normal range of motion.      Cervical back: Normal range of motion.   Skin:     General: Skin is warm and dry.      Capillary Refill: Capillary refill takes less than 2 seconds.   Neurological:      Mental Status: He is alert and oriented to person, place, and time.   Psychiatric:         Behavior: Behavior normal.

## 2024-11-13 ENCOUNTER — OFFICE VISIT (OUTPATIENT)
Dept: SURGERY | Facility: CLINIC | Age: 34
End: 2024-11-13

## 2024-11-13 VITALS
SYSTOLIC BLOOD PRESSURE: 110 MMHG | HEIGHT: 64 IN | RESPIRATION RATE: 16 BRPM | OXYGEN SATURATION: 98 % | BODY MASS INDEX: 27.31 KG/M2 | TEMPERATURE: 98.9 F | WEIGHT: 160 LBS | HEART RATE: 80 BPM | DIASTOLIC BLOOD PRESSURE: 70 MMHG

## 2024-11-13 DIAGNOSIS — K60.2 ANAL FISSURE: ICD-10-CM

## 2024-11-13 PROCEDURE — 99024 POSTOP FOLLOW-UP VISIT: CPT | Performed by: PHYSICIAN ASSISTANT

## 2024-11-13 RX ORDER — DOCUSATE SODIUM 100 MG/1
100 CAPSULE, LIQUID FILLED ORAL 2 TIMES DAILY
Qty: 28 CAPSULE | Refills: 0 | Status: SHIPPED | OUTPATIENT
Start: 2024-11-13 | End: 2024-11-27

## 2024-11-13 RX ORDER — LIDOCAINE HYDROCHLORIDE 20 MG/ML
JELLY TOPICAL AS NEEDED
Qty: 30 ML | Refills: 0 | Status: SHIPPED | OUTPATIENT
Start: 2024-11-13

## 2024-11-13 NOTE — ASSESSMENT & PLAN NOTE
Overall doing ok 2 weeks out from sphincterotomy for history of anal fissure. Has had recurrent constipation after finishing Colace x 1 week. Having hard stools with some blood per rectum. On exam the incision is healing well, no signs of infection or hematoma. I advised continued stool softener use and fiber supplementation. Will plan to re-assess in about 1 month. Copy of op note provided, questions answered, agreeable to the plan.    Orders:    docusate sodium (COLACE) 100 mg capsule; Take 1 capsule (100 mg total) by mouth 2 (two) times a day for 14 days Stop taking for diarrhea. To help reduce pain from bowel movements after surgery.    lidocaine (XYLOCAINE) 2 % topical gel; Apply topically as needed for mild pain

## 2024-11-13 NOTE — PATIENT INSTRUCTIONS
For constipation:    Continue using stool softeners daily (Colace brand, Docusate generic). Take once or twice daily to maintain soft bowel movements daily. If you develop loose stools skip a day or start using it every other day. A refill was sent to your pharmacy, but you can also get this off the shelf at the pharmacy.    Fiber: continue fiber gummies once daily, please drink large glass of water( at least 16 oz) when taking. Consider eating fiber containing fruit daily as well.

## 2024-11-25 ENCOUNTER — OFFICE VISIT (OUTPATIENT)
Dept: SURGERY | Facility: CLINIC | Age: 34
End: 2024-11-25
Payer: COMMERCIAL

## 2024-11-25 VITALS
HEART RATE: 87 BPM | OXYGEN SATURATION: 98 % | DIASTOLIC BLOOD PRESSURE: 60 MMHG | HEIGHT: 64 IN | WEIGHT: 160 LBS | TEMPERATURE: 97.8 F | SYSTOLIC BLOOD PRESSURE: 114 MMHG | BODY MASS INDEX: 27.31 KG/M2

## 2024-11-25 DIAGNOSIS — K60.2 ANAL FISSURE: Primary | ICD-10-CM

## 2024-11-25 PROCEDURE — 99213 OFFICE O/P EST LOW 20 MIN: CPT | Performed by: SURGERY

## 2024-11-25 RX ORDER — LIDOCAINE HYDROCHLORIDE 20 MG/ML
1 JELLY TOPICAL AS NEEDED
COMMUNITY
Start: 2024-11-22

## 2024-11-25 NOTE — ASSESSMENT & PLAN NOTE
Patient returns to the office for accelerated follow-up following a bout of blood per rectum last evening after hard bowel movement.    On physical examination today the patient is well-appearing.  Pleasant competent reliable as a historian.    Inspection of the anal canal reveals no evidence of active bleeding.  Digital rectal examination performed and was normal.  Normal tone smooth contour to the anal canal and no palpable hemorrhoidal disease either internally or externally.    Anoscopy performed.  This revealed friable tissue at the lateral pole of the skin incision status post lateral internal sphincterotomy.  This is the likely etiology of the patient's transient bleeding.    Patient and wife educated regarding the diagnosis.  They have been reassured that the patient is well and safe and then no additional investigations or interventions are indicated at the present time.    Patient will follow-up as scheduled in 2 weeks time in the office.

## 2024-11-25 NOTE — PROGRESS NOTES
Name: Paulino Willis      : 1990      MRN: [id number]  Encounter Provider: Lemuel Varma MD  Encounter Date: 2024   Encounter department: [facility] GENERAL SURGERY [place]  :  Assessment & Plan  Anal fissure  Patient returns to the office for accelerated follow-up following a bout of blood per rectum last evening after hard bowel movement.    On physical examination today the patient is well-appearing.  Pleasant competent reliable as a historian.    Inspection of the anal canal reveals no evidence of active bleeding.  Digital rectal examination performed and was normal.  Normal tone smooth contour to the anal canal and no palpable hemorrhoidal disease either internally or externally.    Anoscopy performed.  This revealed friable tissue at the lateral pole of the skin incision status post lateral internal sphincterotomy.  This is the likely etiology of the patient's transient bleeding.    Patient and wife educated regarding the diagnosis.  They have been reassured that the patient is well and safe and then no additional investigations or interventions are indicated at the present time.    Patient will follow-up as scheduled in 2 weeks time in the office.             History of Present Illness     HPI  Paulino Willis is a 34 y.o. male who presents with rectal bleeding, po lateral interal sphinceterotomy 10/29/2024   History obtained from: patient    Review of Systems   Constitutional:  Negative for chills and fever.   HENT:  Negative for ear pain and sore throat.    Eyes:  Negative for pain and visual disturbance.   Respiratory:  Negative for cough and shortness of breath.    Cardiovascular:  Negative for chest pain and palpitations.   Gastrointestinal:  Negative for abdominal pain and vomiting.   Genitourinary:  Negative for dysuria and hematuria.   Musculoskeletal:  Negative for arthralgias and back pain.   Skin:  Negative for color change and rash.   Neurological:  Negative  for seizures and syncope.   All other systems reviewed and are negative.        Medical History Reviewed by provider this encounter:     .  Past Medical History   Past Medical History:   Diagnosis Date    Acne     Anal fissure     COVID     2023    Uses Hong Konger as primary spoken language      Past Surgical History:   Procedure Laterality Date    ANKLE SURGERY Right     NECK SURGERY      submandibular surgery in [place], possible lymph node    [place] SPHINCTEROTOMY ANAL DIVISION SPHINCTER SPX N/A 10/29/2024    Procedure: SPHINCTEROTOMY;  Surgeon: Lemuel Varma MD;  Location: [place] [facility];  Service: General     History reviewed. No pertinent family history.   reports that he has never smoked. He has never used smokeless tobacco. He reports current alcohol use. He reports that he does not use drugs.  Current Outpatient Medications on File Prior to Visit   Medication Sig Dispense Refill    Ascorbic Acid (vitamin C) 1000 MG tablet Take 1,000 mg by mouth every morning      benzoyl peroxide (benzoyl peroxide) 5 % external liquid Apply topically every morning      clindamycin (CLINDAGEL) 1 % gel Apply topically every morning      docusate sodium (COLACE) 100 mg capsule Take 1 capsule (100 mg total) by mouth 2 (two) times a day for 14 days Stop taking for diarrhea. To help reduce pain from bowel movements after surgery. 28 capsule 0    lidocaine (URO-JET) 2 % urethral/mucosal gel Apply 1 Application topically if needed      lidocaine (XYLOCAINE) 2 % topical gel Apply topically as needed for mild pain 30 mL 0    Multiple Vitamin (multivitamin) capsule Take 1 capsule by mouth daily Few x week      patient supplied medication TRETINOIN CREAM 0.05%  2X weekly evening      sulfamethoxazole-trimethoprim (BACTRIM DS) 800-160 mg per tablet Take 1 tablet by mouth 2 (two) times a day       No current facility-administered medications on file prior to visit.   No Known Allergies   Current Outpatient Medications on File Prior to Visit  "  Medication Sig Dispense Refill    Ascorbic Acid (vitamin C) 1000 MG tablet Take 1,000 mg by mouth every morning      benzoyl peroxide (benzoyl peroxide) 5 % external liquid Apply topically every morning      clindamycin (CLINDAGEL) 1 % gel Apply topically every morning      docusate sodium (COLACE) 100 mg capsule Take 1 capsule (100 mg total) by mouth 2 (two) times a day for 14 days Stop taking for diarrhea. To help reduce pain from bowel movements after surgery. 28 capsule 0    lidocaine (URO-JET) 2 % urethral/mucosal gel Apply 1 Application topically if needed      lidocaine (XYLOCAINE) 2 % topical gel Apply topically as needed for mild pain 30 mL 0    Multiple Vitamin (multivitamin) capsule Take 1 capsule by mouth daily Few x week      patient supplied medication TRETINOIN CREAM 0.05%  2X weekly evening      sulfamethoxazole-trimethoprim (BACTRIM DS) 800-160 mg per tablet Take 1 tablet by mouth 2 (two) times a day       No current facility-administered medications on file prior to visit.      Social History     Tobacco Use    Smoking status: Never    Smokeless tobacco: Never   Substance and Sexual Activity    Alcohol use: Yes     Comment: occasionally     Drug use: Never    Sexual activity: Not on file        Objective   /60 (BP Location: Left arm, Patient Position: Sitting, Cuff Size: Standard)   Pulse 87   Temp 97.8 °F (36.6 °C) (Temporal)   Ht 5' 4\" (1.626 m)   Wt 72.6 kg (160 lb)   SpO2 98%   BMI 27.46 kg/m²      Physical Exam  Vitals and nursing note reviewed.   Constitutional:       General: He is not in acute distress.     Appearance: He is well-developed.   HENT:      Head: Normocephalic and atraumatic.   Eyes:      Conjunctiva/sclera: Conjunctivae normal.   Cardiovascular:      Rate and Rhythm: Normal rate and regular rhythm.      Heart sounds: No murmur heard.  Pulmonary:      Effort: Pulmonary effort is normal. No respiratory distress.      Breath sounds: Normal breath sounds. "   Abdominal:      Palpations: Abdomen is soft.      Tenderness: There is no abdominal tenderness.   Genitourinary:     Comments: Rectal examination as described above  Musculoskeletal:         General: No swelling.      Cervical back: Neck supple.   Skin:     General: Skin is warm and dry.      Capillary Refill: Capillary refill takes less than 2 seconds.   Neurological:      Mental Status: He is alert.   Psychiatric:         Mood and Affect: Mood normal.     Procedures      Administrative Statements   I have spent a total time of 15 minutes in caring for this patient on the day of the visit/encounter including Instructions for management. Topics discussed with the patient / family include symptom assessment and management.

## 2024-12-06 NOTE — PROGRESS NOTES
Name: Paulino Willis      : 1990      MRN: [id number]  Encounter Provider: Rush Spencer PA-C  Encounter Date: 2024   Encounter department: [facility] GENERAL SURGERY [place]  :  Assessment & Plan  Anal fissure  Now 6 weeks out from lateral internal sphincterotomy. Since last visit has not had any additional bleeding. Supplementing fiber with plenty of water and bowel movements now are softer. On external exam no disease or wounds noted. I advised continued fiber supplementation to maintain regular soft bowel movements. He may use sitz bath as needed for discomfort or zinc oxide topically for irritation. Will plan to see back as needed.             History of Present Illness   HPI  Paulino Willis is a 34 y.o. male who presents for his po visit after having a lateral interal sphinceterotomy 10/29/2024. Pt states the rectal bleeding has subsided and theres no rectal pain. Pt denies diarrhea/constipation, abd pain, issues with urination, trouble eating/drinking/swallowing or fevers/chills. Amilia.O,MA  History obtained from: patient    Review of Systems   All other systems reviewed and are negative.    Past Medical History   Past Medical History:   Diagnosis Date    Acne     Anal fissure     COVID         Uses Korean as primary spoken language      Past Surgical History:   Procedure Laterality Date    ANKLE SURGERY Right     NECK SURGERY      submandibular surgery in [place], possible lymph node    [place] SPHINCTEROTOMY ANAL DIVISION SPHINCTER SPX N/A 10/29/2024    Procedure: SPHINCTEROTOMY;  Surgeon: Lemuel Varma MD;  Location: [facility];  Service: General     History reviewed. No pertinent family history.   reports that he has never smoked. He has never used smokeless tobacco. He reports current alcohol use. He reports that he does not use drugs.  Current Outpatient Medications on File Prior to Visit   Medication Sig Dispense Refill    Ascorbic Acid (vitamin C) 1000 MG  "tablet Take 1,000 mg by mouth every morning      benzoyl peroxide (benzoyl peroxide) 5 % external liquid Apply topically every morning      clindamycin (CLINDAGEL) 1 % gel Apply topically every morning      lidocaine (URO-JET) 2 % urethral/mucosal gel Apply 1 Application topically if needed      lidocaine (XYLOCAINE) 2 % topical gel Apply topically as needed for mild pain 30 mL 0    Multiple Vitamin (multivitamin) capsule Take 1 capsule by mouth daily Few x week      patient supplied medication TRETINOIN CREAM 0.05%  2X weekly evening      sulfamethoxazole-trimethoprim (BACTRIM DS) 800-160 mg per tablet Take 1 tablet by mouth 2 (two) times a day      docusate sodium (COLACE) 100 mg capsule Take 1 capsule (100 mg total) by mouth 2 (two) times a day for 14 days Stop taking for diarrhea. To help reduce pain from bowel movements after surgery. 28 capsule 0     No current facility-administered medications on file prior to visit.   No Known Allergies      Objective   /78 (BP Location: Left arm, Patient Position: Sitting, Cuff Size: Standard)   Pulse 93   Temp 97.6 °F (36.4 °C) (Temporal)   Ht 5' 4\" (1.626 m)   Wt 73.5 kg (162 lb)   SpO2 97%   BMI 27.81 kg/m²      Physical Exam  Vitals and nursing note reviewed.   Constitutional:       General: He is not in acute distress.     Appearance: He is well-developed. He is not diaphoretic.   HENT:      Head: Normocephalic and atraumatic.   Eyes:      Conjunctiva/sclera: Conjunctivae normal.      Pupils: Pupils are equal, round, and reactive to light.   Pulmonary:      Effort: No respiratory distress.   Genitourinary:     Comments: Normal external anal exam, no hemorrhoid, wound, irritation.  Musculoskeletal:         General: Normal range of motion.      Cervical back: Normal range of motion.   Skin:     General: Skin is warm and dry.      Capillary Refill: Capillary refill takes less than 2 seconds.   Neurological:      Mental Status: He is alert and oriented to " person, place, and time.   Psychiatric:         Behavior: Behavior normal.

## 2024-12-09 ENCOUNTER — OFFICE VISIT (OUTPATIENT)
Dept: SURGERY | Facility: CLINIC | Age: 34
End: 2024-12-09

## 2024-12-09 VITALS
HEART RATE: 93 BPM | DIASTOLIC BLOOD PRESSURE: 78 MMHG | HEIGHT: 64 IN | BODY MASS INDEX: 27.66 KG/M2 | SYSTOLIC BLOOD PRESSURE: 114 MMHG | WEIGHT: 162 LBS | TEMPERATURE: 97.6 F | OXYGEN SATURATION: 97 %

## 2024-12-09 DIAGNOSIS — K60.2 ANAL FISSURE: Primary | ICD-10-CM

## 2024-12-09 PROCEDURE — 99024 POSTOP FOLLOW-UP VISIT: CPT | Performed by: PHYSICIAN ASSISTANT

## 2024-12-09 NOTE — ASSESSMENT & PLAN NOTE
Now 6 weeks out from lateral internal sphincterotomy. Since last visit has not had any additional bleeding. Supplementing fiber with plenty of water and bowel movements now are softer. On external exam no disease or wounds noted. I advised continued fiber supplementation to maintain regular soft bowel movements. He may use sitz bath as needed for discomfort or zinc oxide topically for irritation. Will plan to see back as needed.          [facility]

## 2025-06-04 ENCOUNTER — OFFICE VISIT (OUTPATIENT)
Dept: FAMILY MEDICINE CLINIC | Facility: CLINIC | Age: 35
End: 2025-06-04
Payer: COMMERCIAL

## 2025-06-04 ENCOUNTER — APPOINTMENT (OUTPATIENT)
Dept: RADIOLOGY | Facility: CLINIC | Age: 35
End: 2025-06-04
Attending: NURSE PRACTITIONER
Payer: COMMERCIAL

## 2025-06-04 ENCOUNTER — APPOINTMENT (OUTPATIENT)
Dept: LAB | Facility: CLINIC | Age: 35
End: 2025-06-04
Attending: NURSE PRACTITIONER
Payer: COMMERCIAL

## 2025-06-04 VITALS
HEART RATE: 72 BPM | BODY MASS INDEX: 27.66 KG/M2 | OXYGEN SATURATION: 98 % | HEIGHT: 64 IN | WEIGHT: 162 LBS | SYSTOLIC BLOOD PRESSURE: 128 MMHG | RESPIRATION RATE: 16 BRPM | DIASTOLIC BLOOD PRESSURE: 70 MMHG | TEMPERATURE: 98.2 F

## 2025-06-04 DIAGNOSIS — K60.2 ANAL FISSURE: ICD-10-CM

## 2025-06-04 DIAGNOSIS — Z13.220 SCREENING FOR HYPERLIPIDEMIA: ICD-10-CM

## 2025-06-04 DIAGNOSIS — L70.0 ACNE VULGARIS: ICD-10-CM

## 2025-06-04 DIAGNOSIS — R53.83 OTHER FATIGUE: ICD-10-CM

## 2025-06-04 DIAGNOSIS — Z00.00 ANNUAL PHYSICAL EXAM: Primary | ICD-10-CM

## 2025-06-04 DIAGNOSIS — Z13.1 SCREENING FOR DIABETES MELLITUS: ICD-10-CM

## 2025-06-04 DIAGNOSIS — M54.50 LOW BACK PAIN, UNSPECIFIED BACK PAIN LATERALITY, UNSPECIFIED CHRONICITY, UNSPECIFIED WHETHER SCIATICA PRESENT: ICD-10-CM

## 2025-06-04 LAB
BASOPHILS # BLD AUTO: 0.05 THOUSANDS/ÂΜL (ref 0–0.1)
BASOPHILS NFR BLD AUTO: 1 % (ref 0–1)
EOSINOPHIL # BLD AUTO: 0.2 THOUSAND/ÂΜL (ref 0–0.61)
EOSINOPHIL NFR BLD AUTO: 3 % (ref 0–6)
ERYTHROCYTE [DISTWIDTH] IN BLOOD BY AUTOMATED COUNT: 13.2 % (ref 11.6–15.1)
HCT VFR BLD AUTO: 47.1 % (ref 36.5–49.3)
HGB BLD-MCNC: 15.2 G/DL (ref 12–17)
IMM GRANULOCYTES # BLD AUTO: 0.03 THOUSAND/UL (ref 0–0.2)
IMM GRANULOCYTES NFR BLD AUTO: 1 % (ref 0–2)
LYMPHOCYTES # BLD AUTO: 2.11 THOUSANDS/ÂΜL (ref 0.6–4.47)
LYMPHOCYTES NFR BLD AUTO: 34 % (ref 14–44)
MCH RBC QN AUTO: 29.1 PG (ref 26.8–34.3)
MCHC RBC AUTO-ENTMCNC: 32.3 G/DL (ref 31.4–37.4)
MCV RBC AUTO: 90 FL (ref 82–98)
MONOCYTES # BLD AUTO: 0.48 THOUSAND/ÂΜL (ref 0.17–1.22)
MONOCYTES NFR BLD AUTO: 8 % (ref 4–12)
NEUTROPHILS # BLD AUTO: 3.39 THOUSANDS/ÂΜL (ref 1.85–7.62)
NEUTS SEG NFR BLD AUTO: 53 % (ref 43–75)
NRBC BLD AUTO-RTO: 0 /100 WBCS
PLATELET # BLD AUTO: 242 THOUSANDS/UL (ref 149–390)
PMV BLD AUTO: 11.6 FL (ref 8.9–12.7)
RBC # BLD AUTO: 5.22 MILLION/UL (ref 3.88–5.62)
WBC # BLD AUTO: 6.26 THOUSAND/UL (ref 4.31–10.16)

## 2025-06-04 PROCEDURE — 80053 COMPREHEN METABOLIC PANEL: CPT

## 2025-06-04 PROCEDURE — 85025 COMPLETE CBC W/AUTO DIFF WBC: CPT

## 2025-06-04 PROCEDURE — 72072 X-RAY EXAM THORAC SPINE 3VWS: CPT

## 2025-06-04 PROCEDURE — 99395 PREV VISIT EST AGE 18-39: CPT | Performed by: NURSE PRACTITIONER

## 2025-06-04 PROCEDURE — 36415 COLL VENOUS BLD VENIPUNCTURE: CPT

## 2025-06-04 PROCEDURE — 81001 URINALYSIS AUTO W/SCOPE: CPT

## 2025-06-04 PROCEDURE — 99214 OFFICE O/P EST MOD 30 MIN: CPT | Performed by: NURSE PRACTITIONER

## 2025-06-04 PROCEDURE — 72110 X-RAY EXAM L-2 SPINE 4/>VWS: CPT

## 2025-06-04 PROCEDURE — 80061 LIPID PANEL: CPT

## 2025-06-04 PROCEDURE — 83036 HEMOGLOBIN GLYCOSYLATED A1C: CPT

## 2025-06-04 RX ORDER — NAPROXEN 500 MG/1
500 TABLET ORAL 2 TIMES DAILY WITH MEALS
COMMUNITY
Start: 2024-12-23

## 2025-06-04 NOTE — PATIENT INSTRUCTIONS
"Patient Education     Routine physical for adults   The Basics   Written by the doctors and editors at [facility]   What is a physical? -- A physical is a routine visit, or \"check-up,\" with your doctor. You might also hear it called a \"wellness visit\" or \"preventive visit.\"  During each visit, the doctor will:   Ask about your physical and mental health   Ask about your habits, behaviors, and lifestyle   Do an exam   Give you vaccines if needed   Talk to you about any medicines you take   Give advice about your health   Answer your questions  Getting regular check-ups is an important part of taking care of your health. It can help your doctor find and treat any problems you have. But it's also important for preventing health problems.  A routine physical is different from a \"sick visit.\" A sick visit is when you see a doctor because of a health concern or problem. Since physicals are scheduled ahead of time, you can think about what you want to ask the doctor.  How often should I get a physical? -- It depends on your age and health. In general, for people age 21 years and older:   If you are younger than 50 years, you might be able to get a physical every 3 years.   If you are 50 years or older, your doctor might recommend a physical every year.  If you have an ongoing health condition, like diabetes or high blood pressure, your doctor will probably want to see you more often.  What happens during a physical? -- In general, each visit will include:   Physical exam - The doctor or nurse will check your height, weight, heart rate, and blood pressure. They will also look at your eyes and ears. They will ask about how you are feeling and whether you have any symptoms that bother you.   Medicines - It's a good idea to bring a list of all the medicines you take to each doctor visit. Your doctor will talk to you about your medicines and answer any questions. Tell them if you are having any side effects that bother you. You " "should also tell them if you are having trouble paying for any of your medicines.   Habits and behaviors - This includes:   Your diet   Your exercise habits   Whether you smoke, drink alcohol, or use drugs   Whether you are sexually active   Whether you feel safe at home  Your doctor will talk to you about things you can do to improve your health and lower your risk of health problems. They will also offer help and support. For example, if you want to quit smoking, they can give you advice and might prescribe medicines. If you want to improve your diet or get more physical activity, they can help you with this, too.   Lab tests, if needed - The tests you get will depend on your age and situation. For example, your doctor might want to check your:   Cholesterol   Blood sugar   Iron level   Vaccines - The recommended vaccines will depend on your age, health, and what vaccines you already had. Vaccines are very important because they can prevent certain serious or deadly infections.   Discussion of screening - \"Screening\" means checking for diseases or other health problems before they cause symptoms. Your doctor can recommend screening based on your age, risk, and preferences. This might include tests to check for:   Cancer, such as breast, prostate, cervical, ovarian, colorectal, prostate, lung, or skin cancer   Sexually transmitted infections, such as chlamydia and gonorrhea   Mental health conditions like depression and anxiety  Your doctor will talk to you about the different types of screening tests. They can help you decide which screenings to have. They can also explain what the results might mean.   Answering questions - The physical is a good time to ask the doctor or nurse questions about your health. If needed, they can refer you to other doctors or specialists, too.  Adults older than 65 years often need other care, too. As you get older, your doctor will talk to you about:   How to prevent falling at " home   Hearing or vision tests   Memory testing   How to take your medicines safely   Making sure that you have the help and support you need at home  All topics are updated as new evidence becomes available and our peer review process is complete.  This topic retrieved from [employer] on: May 02, 2024.  Topic 060061 Version 1.0  Release: 32.4.3 - C32.122  © 2024 UpToDate, Inc. and/or its affiliates. All rights reserved.  Consumer Information Use and Disclaimer   Disclaimer: This generalized information is a limited summary of diagnosis, treatment, and/or medication information. It is not meant to be comprehensive and should be used as a tool to help the user understand and/or assess potential diagnostic and treatment options. It does NOT include all information about conditions, treatments, medications, side effects, or risks that may apply to a specific patient. It is not intended to be medical advice or a substitute for the medical advice, diagnosis, or treatment of a health care provider based on the health care provider's examination and assessment of a patient's specific and unique circumstances. Patients must speak with a health care provider for complete information about their health, medical questions, and treatment options, including any risks or benefits regarding use of medications. This information does not endorse any treatments or medications as safe, effective, or approved for treating a specific patient. UpToDate, Inc. and its affiliates disclaim any warranty or liability relating to this information or the use thereof.The use of this information is governed by the Terms of Use, available at https://www.woltersSlideuwer.com/en/know/clinical-effectiveness-terms. 2024© UpToDate, Inc. and its affiliates and/or licensors. All rights reserved.  Copyright   © 2024 UpToDate, Inc. and/or its affiliates. All rights reserved.

## 2025-06-04 NOTE — PROGRESS NOTES
Adult Annual Physical  Name: Paulino Willis      : 1990      MRN: [id number]  Encounter Provider: CATHY Allison  Encounter Date: 2025   Encounter department: [facility] PRIMARY CARE    :  Assessment & Plan  Annual physical exam               Preventive Screenings:    - Prostate cancer screening: screening not indicated     Immunizations:  - Immunizations due: Tdap      Depression Screening and Follow-up Plan: Patient was screened for depression during today's encounter. They screened negative with a PHQ-2 score of 0.          History of Present Illness     Adult Annual Physical:  Patient presents for annual physical. Patient presents with his wife who is his . He speaks primarily Korean as he is [place]   He is concerned about kidney disease as he father passed and was on dialysis   She states he has low back pain no radiation and no difficulty with urination.  Does feel more back pain with sitting.   This has happened prior in the past and now again there was an injury with movement .     Diet and Physical Activity:  - Diet/Nutrition: well balanced diet.  - Exercise: no formal exercise.    Depression Screening:  - PHQ-2 Score: 0    General Health:  - Sleep: sleeps well and 4-6 hours of sleep on average.  - Hearing: normal hearing bilateral ears.  - Vision: no vision problems.  - Dental: brushes teeth twice daily.     Health:  - History of STDs: no.   - Urinary symptoms: none.     Review of Systems   Constitutional:  Negative for chills and fever.   HENT:  Negative for ear pain and sore throat.    Eyes:  Negative for pain and visual disturbance.   Respiratory:  Negative for cough and shortness of breath.    Cardiovascular:  Negative for chest pain and palpitations.   Gastrointestinal:  Positive for abdominal pain. Negative for vomiting.   Genitourinary:  Negative for dysuria and hematuria.   Musculoskeletal:  Positive for back pain and myalgias. Negative  "for arthralgias.   Skin:  Negative for color change and rash.   Neurological:  Negative for seizures and syncope.   All other systems reviewed and are negative.        Objective   /70   Pulse 72   Temp 98.2 °F (36.8 °C) (Temporal)   Resp 16   Ht 5' 4\" (1.626 m)   Wt 73.5 kg (162 lb)   SpO2 98%   BMI 27.81 kg/m²     Physical Exam  Vitals and nursing note reviewed.   Constitutional:       General: He is not in acute distress.     Appearance: He is well-developed.   HENT:      Head: Normocephalic and atraumatic.     Eyes:      Conjunctiva/sclera: Conjunctivae normal.       Cardiovascular:      Rate and Rhythm: Normal rate and regular rhythm.      Heart sounds: No murmur heard.  Pulmonary:      Effort: Pulmonary effort is normal. No respiratory distress.      Breath sounds: Normal breath sounds.   Abdominal:      Palpations: Abdomen is soft.      Tenderness: There is no abdominal tenderness.     Musculoskeletal:         General: No swelling.      Cervical back: Neck supple.     Skin:     General: Skin is warm and dry.      Capillary Refill: Capillary refill takes less than 2 seconds.     Neurological:      Mental Status: He is alert and oriented to person, place, and time.     Psychiatric:         Mood and Affect: Mood normal.         Behavior: Behavior normal.         "

## 2025-06-05 DIAGNOSIS — E78.2 MIXED HYPERLIPIDEMIA: Primary | ICD-10-CM

## 2025-06-05 LAB
ALBUMIN SERPL BCG-MCNC: 4.9 G/DL (ref 3.5–5)
ALP SERPL-CCNC: 91 U/L (ref 34–104)
ALT SERPL W P-5'-P-CCNC: 59 U/L (ref 7–52)
ANION GAP SERPL CALCULATED.3IONS-SCNC: 11 MMOL/L (ref 4–13)
AST SERPL W P-5'-P-CCNC: 38 U/L (ref 13–39)
BACTERIA UR QL AUTO: ABNORMAL /HPF
BILIRUB SERPL-MCNC: 0.79 MG/DL (ref 0.2–1)
BILIRUB UR QL STRIP: NEGATIVE
BUN SERPL-MCNC: 10 MG/DL (ref 5–25)
CALCIUM SERPL-MCNC: 9.8 MG/DL (ref 8.4–10.2)
CHLORIDE SERPL-SCNC: 101 MMOL/L (ref 96–108)
CHOLEST SERPL-MCNC: 243 MG/DL (ref ?–200)
CLARITY UR: CLEAR
CO2 SERPL-SCNC: 26 MMOL/L (ref 21–32)
COLOR UR: ABNORMAL
CREAT SERPL-MCNC: 0.62 MG/DL (ref 0.6–1.3)
EST. AVERAGE GLUCOSE BLD GHB EST-MCNC: 111 MG/DL
GFR SERPL CREATININE-BSD FRML MDRD: 131 ML/MIN/1.73SQ M
GLUCOSE P FAST SERPL-MCNC: 96 MG/DL (ref 65–99)
GLUCOSE UR STRIP-MCNC: NEGATIVE MG/DL
HBA1C MFR BLD: 5.5 %
HDLC SERPL-MCNC: 47 MG/DL
HGB UR QL STRIP.AUTO: NEGATIVE
KETONES UR STRIP-MCNC: NEGATIVE MG/DL
LDLC SERPL CALC-MCNC: 116 MG/DL (ref 0–100)
LEUKOCYTE ESTERASE UR QL STRIP: NEGATIVE
NITRITE UR QL STRIP: NEGATIVE
NON-SQ EPI CELLS URNS QL MICRO: ABNORMAL /HPF
NONHDLC SERPL-MCNC: 196 MG/DL
PH UR STRIP.AUTO: 6.5 [PH]
POTASSIUM SERPL-SCNC: 4.2 MMOL/L (ref 3.5–5.3)
PROT SERPL-MCNC: 7.8 G/DL (ref 6.4–8.4)
PROT UR STRIP-MCNC: ABNORMAL MG/DL
RBC #/AREA URNS AUTO: ABNORMAL /HPF
SODIUM SERPL-SCNC: 138 MMOL/L (ref 135–147)
SP GR UR STRIP.AUTO: 1.02 (ref 1–1.03)
TRIGL SERPL-MCNC: 400 MG/DL (ref ?–150)
UROBILINOGEN UR STRIP-ACNC: <2 MG/DL
WBC #/AREA URNS AUTO: ABNORMAL /HPF

## 2025-06-05 RX ORDER — ROSUVASTATIN CALCIUM 5 MG/1
5 TABLET, COATED ORAL DAILY
Qty: 30 TABLET | Refills: 5 | Status: SHIPPED | OUTPATIENT
Start: 2025-06-05

## 2025-06-05 RX ORDER — FENOFIBRATE 145 MG/1
145 TABLET, FILM COATED ORAL DAILY
Qty: 30 TABLET | Refills: 5 | Status: SHIPPED | OUTPATIENT
Start: 2025-06-05

## 2025-06-11 ENCOUNTER — TELEPHONE (OUTPATIENT)
Dept: FAMILY MEDICINE CLINIC | Facility: CLINIC | Age: 35
End: 2025-06-11

## 2025-06-11 NOTE — TELEPHONE ENCOUNTER
Wife returned call to PCP office. She states patient was prescribed methocarbamol by Emergency Department provider.     She states patient was referred to Neurology, but first available Neurology appointment with [facility] is 1/4/2026. She asks if patient can be seen at [facility] sooner.    She also states patient is scheduled to see Spine and Pain Specialist through [facility] on Wednesday, 6/18/2025. She asks if PCP prefers that patient see a [facility] provider instead.

## 2025-06-11 NOTE — TELEPHONE ENCOUNTER
Called patients wife left voice mail provider would like to send in muscle relaxer. If patient is agreeable to this, X-ray shown from [facility] is showing muscle spasm

## 2025-06-11 NOTE — TELEPHONE ENCOUNTER
Ok, the methocarbamol is fine to take.   For spine and pain that is great that he can be seen that soon. Keep that appointment    In regards to the Neurologist, we can place the referral I am not sure what their wait time is.

## (undated) DEVICE — CLIPVAC DISPOSABLE FILTER ASSEMBLY - BD SURGICAL CLIPPER: Brand: CLIPVAC

## (undated) DEVICE — NEEDLE 25G X 1 1/2

## (undated) DEVICE — SHEET, T, LAPAROTOMY, STERILE: Brand: MEDLINE

## (undated) DEVICE — PAD GROUNDING DUAL ADULT

## (undated) DEVICE — SURGICAL CLIPPER BLADE GENERAL USE

## (undated) DEVICE — GLOVE INDICATOR PI UNDERGLOVE SZ 7.5 BLUE

## (undated) DEVICE — POV-IOD SOLUTION 4OZ BT

## (undated) DEVICE — SYRINGE 10ML LL

## (undated) DEVICE — NEPTUNE E-SEP SMOKE EVACUATION PENCIL, COATED, 70MM BLADE, PUSH BUTTON SWITCH: Brand: NEPTUNE E-SEP

## (undated) DEVICE — CUP MEDICINE 1OZ 5000/CS 50/PLT: Brand: MEDEGEN MEDICAL PRODUCTS, LLC

## (undated) DEVICE — PREMIUM DRY TRAY LF: Brand: MEDLINE INDUSTRIES, INC.

## (undated) DEVICE — GLOVE INDICATOR PI UNDERGLOVE SZ 8 BLUE

## (undated) DEVICE — SUT VICRYL 4-0 SH 27 IN J415H

## (undated) DEVICE — INTENDED FOR TISSUE SEPARATION, AND OTHER PROCEDURES THAT REQUIRE A SHARP SURGICAL BLADE TO PUNCTURE OR CUT.: Brand: BARD-PARKER ® CARBON RIB-BACK BLADES

## (undated) DEVICE — GLOVE SRG BIOGEL 7.5

## (undated) DEVICE — POOLE SUCTION INSTRUMENT WITH REMOVABLE SHEATH AND PREATTACHED 6' (1.8 M) CLEAR PLASTIC TUBING: Brand: POOLE

## (undated) DEVICE — LUBRICANT JELLY SURGILUBE TUBE 4OZ FLIP TOP

## (undated) DEVICE — SUT CHROMIC 3-0 SH 27 IN G122H

## (undated) DEVICE — DISPOSABLE BRIEF/UNDERWEAR

## (undated) DEVICE — D&C PACK: Brand: MEDLINE INDUSTRIES, INC.

## (undated) DEVICE — NEEDLE 18 G X 1 1/2 SAFETY

## (undated) DEVICE — GLOVE SRG BIOGEL 7

## (undated) DEVICE — SURGIFOAM 7 X 12 SPONGE ABS

## (undated) DEVICE — GARMENT,MEDLINE,DVT,INT,CALF,FOAM,MED: Brand: MEDLINE

## (undated) DEVICE — DISPOSABLE OR TOWEL: Brand: CARDINAL HEALTH

## (undated) DEVICE — SINGLE PORT MANIFOLD: Brand: NEPTUNE 2

## (undated) DEVICE — CARDINAL HEALTH PERINEAL COLD PACK INSTANTSTANDARD: Brand: CARDINAL HEALTH